# Patient Record
Sex: MALE | Race: WHITE | NOT HISPANIC OR LATINO | URBAN - METROPOLITAN AREA
[De-identification: names, ages, dates, MRNs, and addresses within clinical notes are randomized per-mention and may not be internally consistent; named-entity substitution may affect disease eponyms.]

---

## 2017-10-16 ENCOUNTER — OUTPATIENT (OUTPATIENT)
Dept: OUTPATIENT SERVICES | Facility: HOSPITAL | Age: 42
LOS: 1 days | Discharge: HOME | End: 2017-10-16

## 2017-10-16 DIAGNOSIS — Z00.01 ENCOUNTER FOR GENERAL ADULT MEDICAL EXAMINATION WITH ABNORMAL FINDINGS: ICD-10-CM

## 2018-10-18 ENCOUNTER — OUTPATIENT (OUTPATIENT)
Dept: OUTPATIENT SERVICES | Facility: HOSPITAL | Age: 43
LOS: 1 days | Discharge: HOME | End: 2018-10-18

## 2018-10-18 DIAGNOSIS — R59.1 GENERALIZED ENLARGED LYMPH NODES: ICD-10-CM

## 2018-10-18 DIAGNOSIS — E78.00 PURE HYPERCHOLESTEROLEMIA, UNSPECIFIED: ICD-10-CM

## 2018-10-18 DIAGNOSIS — J02.9 ACUTE PHARYNGITIS, UNSPECIFIED: ICD-10-CM

## 2018-10-18 DIAGNOSIS — R73.09 OTHER ABNORMAL GLUCOSE: ICD-10-CM

## 2018-10-31 ENCOUNTER — OUTPATIENT (OUTPATIENT)
Dept: OUTPATIENT SERVICES | Facility: HOSPITAL | Age: 43
LOS: 1 days | Discharge: HOME | End: 2018-10-31

## 2018-10-31 DIAGNOSIS — E04.2 NONTOXIC MULTINODULAR GOITER: ICD-10-CM

## 2019-02-08 ENCOUNTER — OUTPATIENT (OUTPATIENT)
Dept: OUTPATIENT SERVICES | Facility: HOSPITAL | Age: 44
LOS: 1 days | Discharge: HOME | End: 2019-02-08

## 2019-02-08 DIAGNOSIS — N39.0 URINARY TRACT INFECTION, SITE NOT SPECIFIED: ICD-10-CM

## 2019-02-18 PROBLEM — Z00.00 ENCOUNTER FOR PREVENTIVE HEALTH EXAMINATION: Status: ACTIVE | Noted: 2019-02-18

## 2019-03-20 ENCOUNTER — TRANSCRIPTION ENCOUNTER (OUTPATIENT)
Age: 44
End: 2019-03-20

## 2019-03-20 ENCOUNTER — APPOINTMENT (OUTPATIENT)
Dept: UROLOGY | Facility: CLINIC | Age: 44
End: 2019-03-20
Payer: COMMERCIAL

## 2019-03-20 VITALS — BODY MASS INDEX: 37.25 KG/M2 | HEIGHT: 72 IN | WEIGHT: 275 LBS

## 2019-03-20 DIAGNOSIS — Z78.9 OTHER SPECIFIED HEALTH STATUS: ICD-10-CM

## 2019-03-20 DIAGNOSIS — F17.210 NICOTINE DEPENDENCE, CIGARETTES, UNCOMPLICATED: ICD-10-CM

## 2019-03-20 PROCEDURE — 99203 OFFICE O/P NEW LOW 30 MIN: CPT

## 2019-03-20 NOTE — REVIEW OF SYSTEMS
[Chest Pain] : no chest pain [Fever] : no fever [Shortness Of Breath] : no shortness of breath [Constipation] : no constipation [Diarrhea] : no diarrhea [Dysuria] : no dysuria [Confused] : no confusion

## 2019-03-20 NOTE — ASSESSMENT
[FreeTextEntry1] : BPH with lower urinary tract symptoms. Patient states is not bothered enough symptoms to warrant treatment. Return to office yearly sooner if necessary

## 2019-03-20 NOTE — HISTORY OF PRESENT ILLNESS
[FreeTextEntry1] : 44-year-old with history of lower urinary tract symptoms. He has nocturia x0-1, weak urinary flow, rare straining, no urgency, rare intermittency, some daytime frequency, no sensation of incomplete voiding. He does have postvoid dribbling. PSA is one. Uncle had prostate cancer

## 2019-03-20 NOTE — PHYSICAL EXAM
[Normal Appearance] : normal appearance [General Appearance - In No Acute Distress] : no acute distress [] : no respiratory distress [Prostate Tenderness] : the prostate was not tender [Prostate Size ___ (0-4)] : prostate size [unfilled] (scale: 0-4) [No Prostate Nodules] : no prostate nodules [Normal Station and Gait] : the gait and station were normal for the patient's age [Oriented To Time, Place, And Person] : oriented to person, place, and time

## 2019-07-10 ENCOUNTER — TRANSCRIPTION ENCOUNTER (OUTPATIENT)
Age: 44
End: 2019-07-10

## 2020-06-12 ENCOUNTER — APPOINTMENT (OUTPATIENT)
Dept: UROLOGY | Facility: CLINIC | Age: 45
End: 2020-06-12
Payer: COMMERCIAL

## 2020-06-12 VITALS — HEIGHT: 72 IN | TEMPERATURE: 98 F | WEIGHT: 275 LBS | BODY MASS INDEX: 37.25 KG/M2

## 2020-06-12 PROCEDURE — 99213 OFFICE O/P EST LOW 20 MIN: CPT

## 2020-06-12 NOTE — HISTORY OF PRESENT ILLNESS
[FreeTextEntry1] : 45-year-old with BPH and lower urinary tract symptoms. He states no change in symptoms from one year ago with nocturia x0-1, variable urinary stream, or straining, no urgency, rare intermittency and some daytime frequency, no sensation of incomplete voiding. PSA was one one year ago. He has a family history of prostate cancer in his uncle

## 2020-06-12 NOTE — PHYSICAL EXAM
[Normal Appearance] : normal appearance [General Appearance - In No Acute Distress] : no acute distress [Prostate Tenderness] : the prostate was not tender [No Prostate Nodules] : no prostate nodules [Prostate Size ___ (0-4)] : prostate size [unfilled] (scale: 0-4) [Edema] : no peripheral edema [] : no respiratory distress [Respiration, Rhythm And Depth] : normal respiratory rhythm and effort [Oriented To Time, Place, And Person] : oriented to person, place, and time [Normal Station and Gait] : the gait and station were normal for the patient's age

## 2020-06-12 NOTE — REVIEW OF SYSTEMS
[Fever] : no fever [Feeling Poorly] : not feeling poorly [Discharge From Eyes] : no purulent discharge from the eyes [Dry Eyes] : no dryness of the eyes [Nasal Discharge] : no nasal discharge [Sore Throat] : no sore throat [Chest Pain] : no chest pain [Lower Ext Edema] : no extremity edema [Shortness Of Breath] : no shortness of breath [Cough] : no cough [Abdominal Pain] : no abdominal pain [Constipation] : no constipation [Diarrhea] : no diarrhea [Dysuria] : no dysuria [Arthralgias] : no arthralgias [Joint Swelling] : no joint swelling [Confused] : no confusion

## 2020-06-12 NOTE — ASSESSMENT
[FreeTextEntry1] : Stable BPH and lower urinary tract symptoms requiring no treatment [Urinary Symptom or Sign (788.99\R39.89)] : implantation

## 2021-06-15 ENCOUNTER — APPOINTMENT (OUTPATIENT)
Dept: UROLOGY | Facility: CLINIC | Age: 46
End: 2021-06-15

## 2021-12-02 ENCOUNTER — APPOINTMENT (OUTPATIENT)
Dept: SURGERY | Facility: CLINIC | Age: 46
End: 2021-12-02
Payer: COMMERCIAL

## 2021-12-02 VITALS
HEART RATE: 95 BPM | DIASTOLIC BLOOD PRESSURE: 84 MMHG | HEIGHT: 70.5 IN | SYSTOLIC BLOOD PRESSURE: 130 MMHG | WEIGHT: 278 LBS | BODY MASS INDEX: 39.36 KG/M2 | OXYGEN SATURATION: 98 % | TEMPERATURE: 97.7 F

## 2021-12-02 PROCEDURE — 99204 OFFICE O/P NEW MOD 45 MIN: CPT

## 2021-12-02 NOTE — ASSESSMENT
[FreeTextEntry1] : 45 y/o male with Class 2 obesity, HTN, and hyperlipidemia interested in bariatric surgery.

## 2021-12-02 NOTE — PLAN
[FreeTextEntry1] : Mr. DARREN VANCE is a 46 year year old male with obesity who is interested in undergoing laparoscopic sleeve gastrectomy for weight loss.  We discussed in detail how Bariatric Surgery is a tool to help treat the serious disease of obesity, and that His compliance with diet, exercise, and follow-up is crucial to His overall safety and success.  He will require the following pre-operative evaluations and testing:\par \par Lab work\par Psychological evaluation\par Diet history\par Cardiology evaluation\par Pulmonology evaluation, including sleep study\par Gastroenterology evaluation for upper endoscopy\par PMD clearance\par Nutritional evaluation\par Attendance at preoperative support groups\par \par We had a discussion about potential post-operative complications, including but not limited to: bleeding, infection, leak, stricture, blood clots, GERD, problems with anesthesia.  The patient understands and wishes to proceed.\par \par We discussed that smoking of any kind will preclude the patient from being able to have bariatric surgery.\par \par

## 2021-12-02 NOTE — HISTORY OF PRESENT ILLNESS
[de-identified] : Mr. DARREN VANCE is a pleasant 46 year year old male who has been struggling with His weight for many years.    Mr. DARREN VANCE has tried countless diet and exercise programs, but has been unable to lose sufficient weight and keep it off.  He is here today to discuss surgical options for weight loss.\par

## 2021-12-15 ENCOUNTER — NON-APPOINTMENT (OUTPATIENT)
Age: 46
End: 2021-12-15

## 2021-12-16 ENCOUNTER — LABORATORY RESULT (OUTPATIENT)
Age: 46
End: 2021-12-16

## 2021-12-16 LAB
ALBUMIN SERPL ELPH-MCNC: 4.8 G/DL
ALP BLD-CCNC: 50 U/L
ALT SERPL-CCNC: 38 U/L
ANION GAP SERPL CALC-SCNC: 12 MMOL/L
APTT BLD: 35.1 SEC
AST SERPL-CCNC: 19 U/L
BASOPHILS # BLD AUTO: 0.06 K/UL
BASOPHILS NFR BLD AUTO: 0.8 %
BILIRUB SERPL-MCNC: 0.4 MG/DL
BUN SERPL-MCNC: 16 MG/DL
CALCIUM SERPL-MCNC: 9.8 MG/DL
CHLORIDE SERPL-SCNC: 108 MMOL/L
CHOLEST SERPL-MCNC: 127 MG/DL
CO2 SERPL-SCNC: 22 MMOL/L
CREAT SERPL-MCNC: 1.2 MG/DL
EOSINOPHIL # BLD AUTO: 0.17 K/UL
EOSINOPHIL NFR BLD AUTO: 2.4 %
ESTIMATED AVERAGE GLUCOSE: 117 MG/DL
GGT SERPL-CCNC: 24 U/L
GLUCOSE SERPL-MCNC: 100 MG/DL
HBA1C MFR BLD HPLC: 5.7 %
HCT VFR BLD CALC: 43.4 %
HDLC SERPL-MCNC: 39 MG/DL
HGB BLD-MCNC: 14.4 G/DL
IMM GRANULOCYTES NFR BLD AUTO: 0.1 %
INR PPP: 1.04 RATIO
IRON SERPL-MCNC: 70 UG/DL
LDLC SERPL CALC-MCNC: 74 MG/DL
LYMPHOCYTES # BLD AUTO: 1.75 K/UL
LYMPHOCYTES NFR BLD AUTO: 24.4 %
MAGNESIUM SERPL-MCNC: 2 MG/DL
MAN DIFF?: NORMAL
MCHC RBC-ENTMCNC: 29.1 PG
MCHC RBC-ENTMCNC: 33.2 G/DL
MCV RBC AUTO: 87.7 FL
MONOCYTES # BLD AUTO: 0.4 K/UL
MONOCYTES NFR BLD AUTO: 5.6 %
NEUTROPHILS # BLD AUTO: 4.79 K/UL
NEUTROPHILS NFR BLD AUTO: 66.7 %
NONHDLC SERPL-MCNC: 88 MG/DL
PLATELET # BLD AUTO: 229 K/UL
POTASSIUM SERPL-SCNC: 4.4 MMOL/L
PROT SERPL-MCNC: 7.1 G/DL
PT BLD: 11.9 SEC
RBC # BLD: 4.95 M/UL
RBC # FLD: 13.4 %
SODIUM SERPL-SCNC: 142 MMOL/L
T4 FREE SERPL-MCNC: 1.4 NG/DL
TRIGL SERPL-MCNC: 110 MG/DL
TSH SERPL-ACNC: 0.58 UIU/ML
WBC # FLD AUTO: 7.18 K/UL

## 2021-12-17 LAB
25(OH)D3 SERPL-MCNC: 42 NG/ML
PREALB SERPL NEPH-MCNC: 35 MG/DL
T3FREE SERPL-MCNC: 3.86 PG/ML
VIT B12 SERPL-MCNC: 302 PG/ML

## 2021-12-22 ENCOUNTER — NON-APPOINTMENT (OUTPATIENT)
Age: 46
End: 2021-12-22

## 2021-12-23 ENCOUNTER — NON-APPOINTMENT (OUTPATIENT)
Age: 46
End: 2021-12-23

## 2021-12-23 LAB
VIT A SERPL-MCNC: 74.2 UG/DL
VIT C SERPL-MCNC: 0.6 MG/DL
ZINC SERPL-MCNC: 108 UG/DL

## 2021-12-28 LAB — VIT B1 SERPL-MCNC: 169.1 NMOL/L

## 2022-01-03 ENCOUNTER — APPOINTMENT (OUTPATIENT)
Dept: SURGERY | Facility: CLINIC | Age: 47
End: 2022-01-03
Payer: COMMERCIAL

## 2022-01-03 VITALS — HEIGHT: 70.5 IN

## 2022-01-03 PROCEDURE — ZZZZZ: CPT

## 2022-01-31 ENCOUNTER — APPOINTMENT (OUTPATIENT)
Age: 47
End: 2022-01-31
Payer: COMMERCIAL

## 2022-01-31 VITALS
BODY MASS INDEX: 39.2 KG/M2 | RESPIRATION RATE: 14 BRPM | OXYGEN SATURATION: 99 % | HEIGHT: 71 IN | SYSTOLIC BLOOD PRESSURE: 128 MMHG | DIASTOLIC BLOOD PRESSURE: 78 MMHG | HEART RATE: 80 BPM | WEIGHT: 280 LBS

## 2022-01-31 DIAGNOSIS — Z86.79 PERSONAL HISTORY OF OTHER DISEASES OF THE CIRCULATORY SYSTEM: ICD-10-CM

## 2022-01-31 PROCEDURE — 99203 OFFICE O/P NEW LOW 30 MIN: CPT

## 2022-01-31 NOTE — PHYSICAL EXAM
[No Acute Distress] : no acute distress [IV] : Mallampati Class: IV [Normal Appearance] : normal appearance [No Neck Mass] : no neck mass [Normal Rate/Rhythm] : normal rate/rhythm [Normal S1, S2] : normal s1, s2 [No Murmurs] : no murmurs [No Resp Distress] : no resp distress [Clear to Auscultation Bilaterally] : clear to auscultation bilaterally [No Abnormalities] : no abnormalities [Benign] : benign [Normal Gait] : normal gait [No Clubbing] : no clubbing [No Cyanosis] : no cyanosis [No Edema] : no edema [FROM] : FROM [Normal Color/ Pigmentation] : normal color/ pigmentation [No Focal Deficits] : no focal deficits [Oriented x3] : oriented x3 [Normal Affect] : normal affect

## 2022-01-31 NOTE — HISTORY OF PRESENT ILLNESS
[Initial Evaluation] : an initial evaluation of [Witnessed Apnea] : witnessed apnea during sleep [Witnessed Gasping] : witnessed gasping during sleep [Snoring] : snoring [Unrefreshing Sleep] : unrefreshing sleep [Currently Experiencing] : The patient is currently experiencing symptoms. [Gradual] : gradual [Moderate] : moderate in severity [Sleeping on Back] : sleeping on back [Fatigue] : fatigue [Weight Gain] : weight gain [None] : The patient is not currently being treated for this problem [TextBox_4] : SENT FOR PRE OP CLEARANCE FOR BARIATRIC SX\par REPORTS TATIANNA SYMPTOMS\par NON SMOKER

## 2022-02-14 ENCOUNTER — APPOINTMENT (OUTPATIENT)
Dept: SURGERY | Facility: CLINIC | Age: 47
End: 2022-02-14
Payer: COMMERCIAL

## 2022-02-14 VITALS — HEIGHT: 71 IN | BODY MASS INDEX: 39.2 KG/M2 | WEIGHT: 280 LBS

## 2022-02-14 PROCEDURE — ZZZZZ: CPT

## 2022-02-22 ENCOUNTER — LABORATORY RESULT (OUTPATIENT)
Age: 47
End: 2022-02-22

## 2022-02-24 ENCOUNTER — OUTPATIENT (OUTPATIENT)
Dept: OUTPATIENT SERVICES | Facility: HOSPITAL | Age: 47
LOS: 1 days | Discharge: HOME | End: 2022-02-24
Payer: COMMERCIAL

## 2022-02-24 PROCEDURE — 95810 POLYSOM 6/> YRS 4/> PARAM: CPT | Mod: 26

## 2022-02-25 ENCOUNTER — OUTPATIENT (OUTPATIENT)
Dept: OUTPATIENT SERVICES | Facility: HOSPITAL | Age: 47
LOS: 1 days | Discharge: HOME | End: 2022-02-25
Payer: COMMERCIAL

## 2022-02-25 DIAGNOSIS — R06.02 SHORTNESS OF BREATH: ICD-10-CM

## 2022-02-25 DIAGNOSIS — G47.33 OBSTRUCTIVE SLEEP APNEA (ADULT) (PEDIATRIC): ICD-10-CM

## 2022-02-25 PROCEDURE — 94060 EVALUATION OF WHEEZING: CPT | Mod: 26

## 2022-02-25 PROCEDURE — 94727 GAS DIL/WSHOT DETER LNG VOL: CPT | Mod: 26

## 2022-02-25 PROCEDURE — 94729 DIFFUSING CAPACITY: CPT | Mod: 26

## 2022-03-03 ENCOUNTER — APPOINTMENT (OUTPATIENT)
Dept: SURGERY | Facility: CLINIC | Age: 47
End: 2022-03-03
Payer: COMMERCIAL

## 2022-03-03 VITALS — BODY MASS INDEX: 39.34 KG/M2 | HEIGHT: 71 IN | WEIGHT: 281 LBS

## 2022-03-03 PROCEDURE — ZZZZZ: CPT

## 2022-03-10 ENCOUNTER — NON-APPOINTMENT (OUTPATIENT)
Age: 47
End: 2022-03-10

## 2022-03-12 ENCOUNTER — TRANSCRIPTION ENCOUNTER (OUTPATIENT)
Age: 47
End: 2022-03-12

## 2022-03-17 ENCOUNTER — APPOINTMENT (OUTPATIENT)
Dept: GASTROENTEROLOGY | Facility: CLINIC | Age: 47
End: 2022-03-17

## 2022-05-25 ENCOUNTER — APPOINTMENT (OUTPATIENT)
Age: 47
End: 2022-05-25
Payer: COMMERCIAL

## 2022-05-25 VITALS
OXYGEN SATURATION: 98 % | WEIGHT: 283 LBS | BODY MASS INDEX: 39.62 KG/M2 | SYSTOLIC BLOOD PRESSURE: 126 MMHG | HEIGHT: 71 IN | DIASTOLIC BLOOD PRESSURE: 82 MMHG | RESPIRATION RATE: 14 BRPM | HEART RATE: 119 BPM

## 2022-05-25 PROCEDURE — 99213 OFFICE O/P EST LOW 20 MIN: CPT

## 2022-05-25 NOTE — DISCUSSION/SUMMARY
[FreeTextEntry1] : EARLY MOD TATIANNA DENIES SYMPTOMS\par PFT NL\par \par PULMONARY STANDPOINT NO CONTRAINDICATION TO PLANNED PROCEDURE, INTERMEDIATE RISK, CXR PREOP

## 2022-06-01 RX ORDER — AZELASTINE HYDROCHLORIDE 205.5 UG/1
0.15 SPRAY, METERED NASAL
Qty: 1 | Refills: 5 | Status: ACTIVE | COMMUNITY
Start: 2022-06-01 | End: 1900-01-01

## 2022-06-02 ENCOUNTER — RX RENEWAL (OUTPATIENT)
Age: 47
End: 2022-06-02

## 2022-06-02 RX ORDER — FLUTICASONE PROPIONATE 50 UG/1
50 SPRAY, METERED NASAL
Qty: 48 | Refills: 0 | Status: ACTIVE | COMMUNITY
Start: 2022-06-01 | End: 1900-01-01

## 2022-06-08 ENCOUNTER — NON-APPOINTMENT (OUTPATIENT)
Age: 47
End: 2022-06-08

## 2022-06-23 ENCOUNTER — OUTPATIENT (OUTPATIENT)
Dept: OUTPATIENT SERVICES | Facility: HOSPITAL | Age: 47
LOS: 1 days | Discharge: HOME | End: 2022-06-23
Payer: COMMERCIAL

## 2022-06-23 VITALS
HEART RATE: 86 BPM | SYSTOLIC BLOOD PRESSURE: 134 MMHG | OXYGEN SATURATION: 95 % | RESPIRATION RATE: 16 BRPM | WEIGHT: 285.06 LBS | HEIGHT: 71 IN | TEMPERATURE: 98 F | DIASTOLIC BLOOD PRESSURE: 92 MMHG

## 2022-06-23 DIAGNOSIS — Z90.49 ACQUIRED ABSENCE OF OTHER SPECIFIED PARTS OF DIGESTIVE TRACT: Chronic | ICD-10-CM

## 2022-06-23 DIAGNOSIS — E66.01 MORBID (SEVERE) OBESITY DUE TO EXCESS CALORIES: ICD-10-CM

## 2022-06-23 DIAGNOSIS — Z01.818 ENCOUNTER FOR OTHER PREPROCEDURAL EXAMINATION: ICD-10-CM

## 2022-06-23 LAB
ALBUMIN SERPL ELPH-MCNC: 5.3 G/DL — HIGH (ref 3.5–5.2)
ALP SERPL-CCNC: 50 U/L — SIGNIFICANT CHANGE UP (ref 30–115)
ALT FLD-CCNC: 37 U/L — SIGNIFICANT CHANGE UP (ref 0–41)
ANION GAP SERPL CALC-SCNC: 13 MMOL/L — SIGNIFICANT CHANGE UP (ref 7–14)
APTT BLD: 32.6 SEC — SIGNIFICANT CHANGE UP (ref 27–39.2)
AST SERPL-CCNC: 21 U/L — SIGNIFICANT CHANGE UP (ref 0–41)
BASOPHILS # BLD AUTO: 0.07 K/UL — SIGNIFICANT CHANGE UP (ref 0–0.2)
BASOPHILS NFR BLD AUTO: 1 % — SIGNIFICANT CHANGE UP (ref 0–1)
BILIRUB SERPL-MCNC: 0.4 MG/DL — SIGNIFICANT CHANGE UP (ref 0.2–1.2)
BLD GP AB SCN SERPL QL: SIGNIFICANT CHANGE UP
BUN SERPL-MCNC: 16 MG/DL — SIGNIFICANT CHANGE UP (ref 10–20)
CALCIUM SERPL-MCNC: 10 MG/DL — SIGNIFICANT CHANGE UP (ref 8.5–10.1)
CHLORIDE SERPL-SCNC: 102 MMOL/L — SIGNIFICANT CHANGE UP (ref 98–110)
CO2 SERPL-SCNC: 23 MMOL/L — SIGNIFICANT CHANGE UP (ref 17–32)
CREAT SERPL-MCNC: 1.1 MG/DL — SIGNIFICANT CHANGE UP (ref 0.7–1.5)
EGFR: 83 ML/MIN/1.73M2 — SIGNIFICANT CHANGE UP
EOSINOPHIL # BLD AUTO: 0.18 K/UL — SIGNIFICANT CHANGE UP (ref 0–0.7)
EOSINOPHIL NFR BLD AUTO: 2.6 % — SIGNIFICANT CHANGE UP (ref 0–8)
GLUCOSE SERPL-MCNC: 86 MG/DL — SIGNIFICANT CHANGE UP (ref 70–99)
HCT VFR BLD CALC: 46.3 % — SIGNIFICANT CHANGE UP (ref 42–52)
HGB BLD-MCNC: 15.9 G/DL — SIGNIFICANT CHANGE UP (ref 14–18)
IMM GRANULOCYTES NFR BLD AUTO: 0.3 % — SIGNIFICANT CHANGE UP (ref 0.1–0.3)
INR BLD: 1.01 RATIO — SIGNIFICANT CHANGE UP (ref 0.65–1.3)
LYMPHOCYTES # BLD AUTO: 2.42 K/UL — SIGNIFICANT CHANGE UP (ref 1.2–3.4)
LYMPHOCYTES # BLD AUTO: 35.5 % — SIGNIFICANT CHANGE UP (ref 20.5–51.1)
MCHC RBC-ENTMCNC: 29 PG — SIGNIFICANT CHANGE UP (ref 27–31)
MCHC RBC-ENTMCNC: 34.3 G/DL — SIGNIFICANT CHANGE UP (ref 32–37)
MCV RBC AUTO: 84.5 FL — SIGNIFICANT CHANGE UP (ref 80–94)
MONOCYTES # BLD AUTO: 0.45 K/UL — SIGNIFICANT CHANGE UP (ref 0.1–0.6)
MONOCYTES NFR BLD AUTO: 6.6 % — SIGNIFICANT CHANGE UP (ref 1.7–9.3)
NEUTROPHILS # BLD AUTO: 3.68 K/UL — SIGNIFICANT CHANGE UP (ref 1.4–6.5)
NEUTROPHILS NFR BLD AUTO: 54 % — SIGNIFICANT CHANGE UP (ref 42.2–75.2)
NRBC # BLD: 0 /100 WBCS — SIGNIFICANT CHANGE UP (ref 0–0)
PLATELET # BLD AUTO: 227 K/UL — SIGNIFICANT CHANGE UP (ref 130–400)
POTASSIUM SERPL-MCNC: 3.9 MMOL/L — SIGNIFICANT CHANGE UP (ref 3.5–5)
POTASSIUM SERPL-SCNC: 3.9 MMOL/L — SIGNIFICANT CHANGE UP (ref 3.5–5)
PROT SERPL-MCNC: 7.7 G/DL — SIGNIFICANT CHANGE UP (ref 6–8)
PROTHROM AB SERPL-ACNC: 11.6 SEC — SIGNIFICANT CHANGE UP (ref 9.95–12.87)
RBC # BLD: 5.48 M/UL — SIGNIFICANT CHANGE UP (ref 4.7–6.1)
RBC # FLD: 12.6 % — SIGNIFICANT CHANGE UP (ref 11.5–14.5)
SODIUM SERPL-SCNC: 138 MMOL/L — SIGNIFICANT CHANGE UP (ref 135–146)
WBC # BLD: 6.82 K/UL — SIGNIFICANT CHANGE UP (ref 4.8–10.8)
WBC # FLD AUTO: 6.82 K/UL — SIGNIFICANT CHANGE UP (ref 4.8–10.8)

## 2022-06-23 PROCEDURE — 93010 ELECTROCARDIOGRAM REPORT: CPT

## 2022-06-23 NOTE — H&P PST ADULT - HISTORY OF PRESENT ILLNESS
47YR old male with morbid obesity -PMH HTN, HDL opts to have bariatric surgery -Laparoscopic sleeve gastrectomy, possible hiatal hernia repair, possible open, possible intraoperative endoscopy and all indicated procedures. Denies COVID S/S. Recd 2 doses vaccine. Verbalized understanding of COVID prevention measures. Exercise juana 2FOS.  Anesthesia Alert  YES--Difficult Airway  NO--History of neck surgery or radiation  NO--Limited ROM of neck  NO--History of Malignant hyperthermia  NO--Personal or family history of Pseudocholinesterase deficiency  NO--Prior Anesthesia Complication  NO--Latex Allergy  NO--Loose teeth  NO--History of Rheumatoid Arthritis  NO--TATIANNA  No Bleeding risk  NO--Other_____

## 2022-06-23 NOTE — H&P PST ADULT - NSICDXPASTMEDICALHX_GEN_ALL_CORE_FT
PAST MEDICAL HISTORY:  GERD (gastroesophageal reflux disease)     HTN (hypertension)     Hypercholesteremia     Obesity

## 2022-06-23 NOTE — H&P PST ADULT - NSANTHOSAYNRD_GEN_A_CORE
No. TATIANNA screening performed.  STOP BANG Legend: 0-2 = LOW Risk; 3-4 = INTERMEDIATE Risk; 5-8 = HIGH Risk

## 2022-06-24 LAB
A1C WITH ESTIMATED AVERAGE GLUCOSE RESULT: 5.8 % — HIGH (ref 4–5.6)
ESTIMATED AVERAGE GLUCOSE: 120 MG/DL — HIGH (ref 68–114)

## 2022-06-27 RX ORDER — ATORVASTATIN CALCIUM 20 MG/1
20 TABLET, FILM COATED ORAL
Refills: 0 | Status: ACTIVE | COMMUNITY

## 2022-07-01 ENCOUNTER — APPOINTMENT (OUTPATIENT)
Dept: SURGERY | Facility: CLINIC | Age: 47
End: 2022-07-01

## 2022-07-01 VITALS
OXYGEN SATURATION: 97 % | SYSTOLIC BLOOD PRESSURE: 120 MMHG | TEMPERATURE: 93.9 F | HEIGHT: 71 IN | DIASTOLIC BLOOD PRESSURE: 90 MMHG | WEIGHT: 289.13 LBS | HEART RATE: 104 BPM | BODY MASS INDEX: 40.48 KG/M2

## 2022-07-01 PROCEDURE — 99213 OFFICE O/P EST LOW 20 MIN: CPT

## 2022-07-01 NOTE — REASON FOR VISIT
[Follow-Up Visit] : a follow-up visit for [Morbid Obesity (BMI<40)] : morbid obesity (bmi<40) [FreeTextEntry2] : Patient presents for Preoperative Bariatric visit

## 2022-07-01 NOTE — ASSESSMENT
[FreeTextEntry1] : DARREN VANCE is a 47 year male seen today for Preoperative Bariatric follow up visit. All medications were reviewed with patient and instructions were given in respect to medications to take on the day of surgery as well as postoperatively.  Written instructions and materials were provided.  All questions were answered.\par

## 2022-07-01 NOTE — HISTORY OF PRESENT ILLNESS
[de-identified] : DARREN VANCE  underwent extensive preoperative workup and is scheduled to have Laparoscopic Sleeve Gastrectomy on 7/11/2022. Patient will benefit from surgery to improve his quality of life and for management of his comorbid conditions.\par At this preoperative visit, we discussed the risks, benefits and alternatives to weight loss surgery. We specifically discussed the risks of anesthesia including cardiac and pulmonary complications, DVT/PE, pneumonia, leaks, infection, death, bleeding, ulcers, and need for additional operations have been reviewed. We discussed the importance of a consistent diet and exercise regimen in regards to weight loss and maintenance as well. The importance of lifelong mineral and vitamin supplementation was also reviewed with the patient. The patient was given ample opportunity to ask questions and all questions were answered\par

## 2022-07-01 NOTE — PLAN
[FreeTextEntry1] : Plan: Proceed with surgery on 7/11/2022.\par          RTO for postop visit on 7/20/22.\par          Call with concerns.

## 2022-07-10 RX ORDER — ACETAMINOPHEN 500 MG
1000 TABLET ORAL ONCE
Refills: 0 | Status: DISCONTINUED | OUTPATIENT
Start: 2022-07-11 | End: 2022-07-12

## 2022-07-11 ENCOUNTER — RESULT REVIEW (OUTPATIENT)
Age: 47
End: 2022-07-11

## 2022-07-11 ENCOUNTER — APPOINTMENT (OUTPATIENT)
Dept: SURGERY | Facility: HOSPITAL | Age: 47
End: 2022-07-11

## 2022-07-11 ENCOUNTER — TRANSCRIPTION ENCOUNTER (OUTPATIENT)
Age: 47
End: 2022-07-11

## 2022-07-11 ENCOUNTER — INPATIENT (INPATIENT)
Facility: HOSPITAL | Age: 47
LOS: 0 days | Discharge: HOME | End: 2022-07-12
Attending: SURGERY | Admitting: SURGERY

## 2022-07-11 VITALS
HEART RATE: 95 BPM | WEIGHT: 279.99 LBS | SYSTOLIC BLOOD PRESSURE: 129 MMHG | TEMPERATURE: 98 F | RESPIRATION RATE: 17 BRPM | DIASTOLIC BLOOD PRESSURE: 81 MMHG | HEIGHT: 72 IN

## 2022-07-11 DIAGNOSIS — Z90.49 ACQUIRED ABSENCE OF OTHER SPECIFIED PARTS OF DIGESTIVE TRACT: Chronic | ICD-10-CM

## 2022-07-11 LAB
ABO RH CONFIRMATION: SIGNIFICANT CHANGE UP
ANION GAP SERPL CALC-SCNC: 14 MMOL/L — SIGNIFICANT CHANGE UP (ref 7–14)
BUN SERPL-MCNC: 14 MG/DL — SIGNIFICANT CHANGE UP (ref 10–20)
CALCIUM SERPL-MCNC: 8.7 MG/DL — SIGNIFICANT CHANGE UP (ref 8.5–10.1)
CHLORIDE SERPL-SCNC: 104 MMOL/L — SIGNIFICANT CHANGE UP (ref 98–110)
CO2 SERPL-SCNC: 22 MMOL/L — SIGNIFICANT CHANGE UP (ref 17–32)
CREAT SERPL-MCNC: 1.1 MG/DL — SIGNIFICANT CHANGE UP (ref 0.7–1.5)
EGFR: 83 ML/MIN/1.73M2 — SIGNIFICANT CHANGE UP
GLUCOSE SERPL-MCNC: 111 MG/DL — HIGH (ref 70–99)
HCT VFR BLD CALC: 39.5 % — LOW (ref 42–52)
HGB BLD-MCNC: 13.8 G/DL — LOW (ref 14–18)
MAGNESIUM SERPL-MCNC: 1.8 MG/DL — SIGNIFICANT CHANGE UP (ref 1.8–2.4)
MCHC RBC-ENTMCNC: 29.4 PG — SIGNIFICANT CHANGE UP (ref 27–31)
MCHC RBC-ENTMCNC: 34.9 G/DL — SIGNIFICANT CHANGE UP (ref 32–37)
MCV RBC AUTO: 84.2 FL — SIGNIFICANT CHANGE UP (ref 80–94)
NRBC # BLD: 0 /100 WBCS — SIGNIFICANT CHANGE UP (ref 0–0)
PHOSPHATE SERPL-MCNC: 3.5 MG/DL — SIGNIFICANT CHANGE UP (ref 2.1–4.9)
PLATELET # BLD AUTO: 197 K/UL — SIGNIFICANT CHANGE UP (ref 130–400)
POTASSIUM SERPL-MCNC: 4 MMOL/L — SIGNIFICANT CHANGE UP (ref 3.5–5)
POTASSIUM SERPL-SCNC: 4 MMOL/L — SIGNIFICANT CHANGE UP (ref 3.5–5)
RBC # BLD: 4.69 M/UL — LOW (ref 4.7–6.1)
RBC # FLD: 13.2 % — SIGNIFICANT CHANGE UP (ref 11.5–14.5)
SODIUM SERPL-SCNC: 140 MMOL/L — SIGNIFICANT CHANGE UP (ref 135–146)
WBC # BLD: 8.82 K/UL — SIGNIFICANT CHANGE UP (ref 4.8–10.8)
WBC # FLD AUTO: 8.82 K/UL — SIGNIFICANT CHANGE UP (ref 4.8–10.8)

## 2022-07-11 PROCEDURE — 99223 1ST HOSP IP/OBS HIGH 75: CPT | Mod: 24,25

## 2022-07-11 PROCEDURE — 88307 TISSUE EXAM BY PATHOLOGIST: CPT | Mod: 26

## 2022-07-11 PROCEDURE — 43775 LAP SLEEVE GASTRECTOMY: CPT

## 2022-07-11 PROCEDURE — 93010 ELECTROCARDIOGRAM REPORT: CPT

## 2022-07-11 RX ORDER — SODIUM CHLORIDE 9 MG/ML
1000 INJECTION, SOLUTION INTRAVENOUS
Refills: 0 | Status: DISCONTINUED | OUTPATIENT
Start: 2022-07-11 | End: 2022-07-11

## 2022-07-11 RX ORDER — LOSARTAN POTASSIUM 100 MG/1
1 TABLET, FILM COATED ORAL
Qty: 0 | Refills: 0 | DISCHARGE

## 2022-07-11 RX ORDER — HYDROMORPHONE HYDROCHLORIDE 2 MG/ML
0.5 INJECTION INTRAMUSCULAR; INTRAVENOUS; SUBCUTANEOUS
Refills: 0 | Status: DISCONTINUED | OUTPATIENT
Start: 2022-07-11 | End: 2022-07-11

## 2022-07-11 RX ORDER — ATORVASTATIN CALCIUM 80 MG/1
20 TABLET, FILM COATED ORAL AT BEDTIME
Refills: 0 | Status: DISCONTINUED | OUTPATIENT
Start: 2022-07-11 | End: 2022-07-12

## 2022-07-11 RX ORDER — PANTOPRAZOLE SODIUM 20 MG/1
40 TABLET, DELAYED RELEASE ORAL DAILY
Refills: 0 | Status: DISCONTINUED | OUTPATIENT
Start: 2022-07-11 | End: 2022-07-12

## 2022-07-11 RX ORDER — FAMOTIDINE 10 MG/ML
20 INJECTION INTRAVENOUS ONCE
Refills: 0 | Status: COMPLETED | OUTPATIENT
Start: 2022-07-11 | End: 2022-07-11

## 2022-07-11 RX ORDER — HYDROMORPHONE HYDROCHLORIDE 2 MG/ML
1 INJECTION INTRAMUSCULAR; INTRAVENOUS; SUBCUTANEOUS
Refills: 0 | Status: DISCONTINUED | OUTPATIENT
Start: 2022-07-11 | End: 2022-07-11

## 2022-07-11 RX ORDER — ICOSAPENT ETHYL 500 MG/1
2 CAPSULE, LIQUID FILLED ORAL
Qty: 0 | Refills: 0 | DISCHARGE

## 2022-07-11 RX ORDER — ONDANSETRON 8 MG/1
4 TABLET, FILM COATED ORAL EVERY 6 HOURS
Refills: 0 | Status: DISCONTINUED | OUTPATIENT
Start: 2022-07-11 | End: 2022-07-12

## 2022-07-11 RX ORDER — ATORVASTATIN CALCIUM 80 MG/1
1 TABLET, FILM COATED ORAL
Qty: 0 | Refills: 0 | DISCHARGE

## 2022-07-11 RX ORDER — SCOPALAMINE 1 MG/3D
1 PATCH, EXTENDED RELEASE TRANSDERMAL ONCE
Refills: 0 | Status: COMPLETED | OUTPATIENT
Start: 2022-07-11 | End: 2022-07-11

## 2022-07-11 RX ORDER — METOCLOPRAMIDE HCL 10 MG
10 TABLET ORAL ONCE
Refills: 0 | Status: DISCONTINUED | OUTPATIENT
Start: 2022-07-11 | End: 2022-07-11

## 2022-07-11 RX ORDER — LOSARTAN POTASSIUM 100 MG/1
25 TABLET, FILM COATED ORAL DAILY
Refills: 0 | Status: DISCONTINUED | OUTPATIENT
Start: 2022-07-11 | End: 2022-07-12

## 2022-07-11 RX ORDER — OMEPRAZOLE 10 MG/1
1 CAPSULE, DELAYED RELEASE ORAL
Qty: 0 | Refills: 0 | DISCHARGE

## 2022-07-11 RX ORDER — BUPIVACAINE 13.3 MG/ML
20 INJECTION, SUSPENSION, LIPOSOMAL INFILTRATION ONCE
Refills: 0 | Status: DISCONTINUED | OUTPATIENT
Start: 2022-07-11 | End: 2022-07-11

## 2022-07-11 RX ORDER — KETOROLAC TROMETHAMINE 30 MG/ML
15 SYRINGE (ML) INJECTION EVERY 8 HOURS
Refills: 0 | Status: DISCONTINUED | OUTPATIENT
Start: 2022-07-11 | End: 2022-07-12

## 2022-07-11 RX ORDER — ENOXAPARIN SODIUM 100 MG/ML
40 INJECTION SUBCUTANEOUS ONCE
Refills: 0 | Status: COMPLETED | OUTPATIENT
Start: 2022-07-11 | End: 2022-07-11

## 2022-07-11 RX ORDER — ACETAMINOPHEN 500 MG
1000 TABLET ORAL ONCE
Refills: 0 | Status: COMPLETED | OUTPATIENT
Start: 2022-07-11 | End: 2022-07-11

## 2022-07-11 RX ORDER — FENOFIBRATE,MICRONIZED 130 MG
1 CAPSULE ORAL
Qty: 0 | Refills: 0 | DISCHARGE

## 2022-07-11 RX ORDER — GABAPENTIN 400 MG/1
100 CAPSULE ORAL THREE TIMES A DAY
Refills: 0 | Status: DISCONTINUED | OUTPATIENT
Start: 2022-07-11 | End: 2022-07-12

## 2022-07-11 RX ORDER — SODIUM CHLORIDE 9 MG/ML
1000 INJECTION, SOLUTION INTRAVENOUS
Refills: 0 | Status: DISCONTINUED | OUTPATIENT
Start: 2022-07-11 | End: 2022-07-12

## 2022-07-11 RX ORDER — ENOXAPARIN SODIUM 100 MG/ML
40 INJECTION SUBCUTANEOUS EVERY 24 HOURS
Refills: 0 | Status: DISCONTINUED | OUTPATIENT
Start: 2022-07-12 | End: 2022-07-12

## 2022-07-11 RX ADMIN — PANTOPRAZOLE SODIUM 40 MILLIGRAM(S): 20 TABLET, DELAYED RELEASE ORAL at 12:17

## 2022-07-11 RX ADMIN — FAMOTIDINE 20 MILLIGRAM(S): 10 INJECTION INTRAVENOUS at 07:09

## 2022-07-11 RX ADMIN — Medication 15 MILLIGRAM(S): at 15:48

## 2022-07-11 RX ADMIN — ENOXAPARIN SODIUM 40 MILLIGRAM(S): 100 INJECTION SUBCUTANEOUS at 06:24

## 2022-07-11 RX ADMIN — HYDROMORPHONE HYDROCHLORIDE 1 MILLIGRAM(S): 2 INJECTION INTRAMUSCULAR; INTRAVENOUS; SUBCUTANEOUS at 09:32

## 2022-07-11 RX ADMIN — HYDROMORPHONE HYDROCHLORIDE 0.5 MILLIGRAM(S): 2 INJECTION INTRAMUSCULAR; INTRAVENOUS; SUBCUTANEOUS at 10:40

## 2022-07-11 RX ADMIN — ONDANSETRON 4 MILLIGRAM(S): 8 TABLET, FILM COATED ORAL at 21:18

## 2022-07-11 RX ADMIN — SODIUM CHLORIDE 125 MILLILITER(S): 9 INJECTION, SOLUTION INTRAVENOUS at 12:17

## 2022-07-11 RX ADMIN — ATORVASTATIN CALCIUM 20 MILLIGRAM(S): 80 TABLET, FILM COATED ORAL at 21:20

## 2022-07-11 RX ADMIN — SCOPALAMINE 1 PATCH: 1 PATCH, EXTENDED RELEASE TRANSDERMAL at 06:25

## 2022-07-11 RX ADMIN — HYDROMORPHONE HYDROCHLORIDE 0.5 MILLIGRAM(S): 2 INJECTION INTRAMUSCULAR; INTRAVENOUS; SUBCUTANEOUS at 10:25

## 2022-07-11 RX ADMIN — Medication 400 MILLIGRAM(S): at 21:18

## 2022-07-11 NOTE — CHART NOTE - NSCHARTNOTEFT_GEN_A_CORE
PACU ANESTHESIA ADMISSION NOTE      Procedure: Laparoscopic sleeve gastrectomy    Upper endoscopy    Block, transversus abdominis plane, bilateral      Post op diagnosis:  Class 2 severe obesity with serious comorbidity in adult        ____  Intubated  TV:______       Rate: ______      FiO2: ______    __x__  Patent Airway    __x__  Full return of protective reflexes    __x__  Full recovery from anesthesia / back to baseline status    Vitals:  T(C): 36.9 (07-11-22 @ 07:09), Max: 36.9 (07-11-22 @ 06:17)  HR: 95 (07-11-22 @ 07:09) (95 - 95)  BP: 129/81 (07-11-22 @ 07:09) (129/81 - 129/81)  RR: 17 (07-11-22 @ 07:09) (17 - 17)  SpO2: --    Mental Status:  __x__ Awake   ___x__ Alert   _____ Drowsy   _____ Sedated    Nausea/Vomiting:  __x__ NO  ______Yes,   See Post - Op Orders          Pain Scale (0-10):  __0___    Treatment: ____ None    __x__ See Post - Op/PCA Orders    Post - Operative Fluids:   ____ Oral   __x__ See Post - Op Orders    Plan: Discharge:   ____Home       _____Floor     ___x__Critical Care (SDU)    _____  Other:_________________    Comments: Patient had smooth intraoperative event, no anesthesia complication.  PACU Vital signs: HR:  90          BP:    129    /   72       RR:  23           O2 Sat:    99   %     Temp 99.2F

## 2022-07-11 NOTE — CHART NOTE - NSCHARTNOTEFT_GEN_A_CORE
GENERAL SURGERY PROGRESS NOTE    Patient: DARREN VANCE , 47y ([birth year])Male   MRN: [id number]  Location: [address]  Visit: 07-11-22 Inpatient  Date: 07-11-22 @ 16:04    No acute issues post-operatively. Pain well controlled, patient is ambulating, denies nausea, vomiting, fever, or chills. Hemodynamically stable, afebrile, and voiding spontaneously. Patient reports consumption of 2oz bariatric clears. - flatus, - BM     PAST MEDICAL & SURGICAL HISTORY:  HTN (hypertension)  Obesity  Hypercholesteremia  GERD (gastroesophageal reflux disease)  History of cholecystectomy    Vitals:   T(F): 98 (07-11-22 @ 13:34), Max: 99.2 (07-11-22 @ 09:10)  HR: 85 (07-11-22 @ 13:34)  BP: 141/83 (07-11-22 @ 13:34)  RR: 16 (07-11-22 @ 12:00)  SpO2: 95% (07-11-22 @ 12:00)      Diet, Clear Liquid:   Bariatric Clear Liquid (BARICLLIQ)      Fluids: lactated ringers.: Solution, 1000 milliLiter(s) infuse at 125 mL/Hr      I & O's:      Bowel Movement: : [] YES [x] NO  Flatus: : [] YES [x] NO    PHYSICAL EXAM:  General: NAD, AAOx3, calm and cooperative  HEENT: NCAT  Cardiac: No peripheral cyanosis or pallor, extremities well perfused   Respiratory: Equal chest rise bilaterally, non-labored breathing   Abdomen: Soft, non-distended, appropriately tender to palpation site at incision sites.   Musculoskeletal: Strength 5/5 BL UE/LE, ROM intact, compartments soft  Neuro: At baseline, no focal deficits  Vascular: Pulses 2+ throughout, extremities well perfused  Skin: Warm/dry, normal color, no jaundice  Incision/wound: healing well, dressings in place, clean, dry and intact    MEDICATIONS  (STANDING):  acetaminophen   IVPB .. 1000 milliGRAM(s) IV Intermittent once  atorvastatin 20 milliGRAM(s) Oral at bedtime  lactated ringers. 1000 milliLiter(s) (125 mL/Hr) IV Continuous <Continuous>  losartan 25 milliGRAM(s) Oral daily  pantoprazole  Injectable 40 milliGRAM(s) IV Push daily    MEDICATIONS  (PRN):  acetaminophen   IVPB .. 1000 milliGRAM(s) IV Intermittent once PRN Moderate Pain (4 - 6)  gabapentin Solution 100 milliGRAM(s) Oral three times a day PRN pain  ketorolac   Injectable 15 milliGRAM(s) IV Push every 8 hours PRN Moderate Pain (4 - 6)  ondansetron Injectable 4 milliGRAM(s) IV Push every 6 hours PRN Nausea      DVT PROPHYLAXIS:   GI PROPHYLAXIS: pantoprazole  Injectable 40 milliGRAM(s) IV Push daily    ANTICOAGULATION:   ANTIBIOTICS:        Analysis: GENERAL SURGERY PROGRESS NOTE    Patient: DARREN VANCE , 47y ([birth year])Male   MRN: [id number]  Location: [address]  Visit: 07-11-22 Inpatient  Date: 07-11-22 @ 16:04    No acute issues post-operatively. Pain well controlled, patient is ambulating, denies nausea, vomiting, fever, or chills. Hemodynamically stable, afebrile, and voiding spontaneously. Patient reports consumption of 2oz bariatric clears. - flatus, - BM     PAST MEDICAL & SURGICAL HISTORY:  HTN (hypertension)  Obesity  Hypercholesteremia  GERD (gastroesophageal reflux disease)  History of cholecystectomy    Vitals:   T(F): 98 (07-11-22 @ 13:34), Max: 99.2 (07-11-22 @ 09:10)  HR: 85 (07-11-22 @ 13:34)  BP: 141/83 (07-11-22 @ 13:34)  RR: 16 (07-11-22 @ 12:00)  SpO2: 95% (07-11-22 @ 12:00)      Diet, Clear Liquid:   Bariatric Clear Liquid (BARICLLIQ)      Fluids: lactated ringers.: Solution, 1000 milliLiter(s) infuse at 125 mL/Hr      I & O's:      Bowel Movement: : [] YES [x] NO  Flatus: : [] YES [x] NO    PHYSICAL EXAM:  General: NAD, AAOx3, calm and cooperative    Assessment:     HEENT: NCAT  Cardiac: No peripheral cyanosis or pallor, extremities well perfused   Respiratory: Equal chest rise bilaterally, non-labored breathing   Abdomen: Soft, non-distended, appropriately tender to palpation site at incision sites.   Musculoskeletal: Strength 5/5 BL UE/LE, ROM intact, compartments soft  Neuro: At baseline, no focal deficits  Vascular: Pulses 2+ throughout, extremities well perfused  Skin: Warm/dry, normal color, no jaundice  Incision/wound: healing well, dressings in place, clean, dry and intact    MEDICATIONS  (STANDING):  acetaminophen   IVPB .. 1000 milliGRAM(s) IV Intermittent once  atorvastatin 20 milliGRAM(s) Oral at bedtime  lactated ringers. 1000 milliLiter(s) (125 mL/Hr) IV Continuous <Continuous>  losartan 25 milliGRAM(s) Oral daily  pantoprazole  Injectable 40 milliGRAM(s) IV Push daily    MEDICATIONS  (PRN):  acetaminophen   IVPB .. 1000 milliGRAM(s) IV Intermittent once PRN Moderate Pain (4 - 6)  gabapentin Solution 100 milliGRAM(s) Oral three times a day PRN pain  ketorolac   Injectable 15 milliGRAM(s) IV Push every 8 hours PRN Moderate Pain (4 - 6)  ondansetron Injectable 4 milliGRAM(s) IV Push every 6 hours PRN Nausea      DVT PROPHYLAXIS:   GI PROPHYLAXIS: pantoprazole  Injectable 40 milliGRAM(s) IV Push daily    ANTICOAGULATION:   ANTIBIOTICS:        Assessment:     46 y/o M w/ mhx of obesity, HTN, HLD s/p elective laparoscopic sleeve gastrectomy. Patient tolerated procedure well with no issues.     - Multimodal pain control  - Encourage IS, ambulation   - Continuous hemodynamic monitoring   - Maintain SpO2 >92%   - Bariatric Clears (patient tolerated two 1oz cups)   - Denies nausea, vomiting  - (-) flatus, (+) BM   - Maintain UOP >0.5cc/kg/hr  - Replete electrolytes PRN   - Strict I/Os   - Mechanical and chemical DVT ppx   - OOBTC   - REST OF CARE PER SICU GENERAL SURGERY PROGRESS NOTE    Patient: DARREN VANCE , 47y ([birth year])Male   MRN: [id number]  Location: [address]  Visit: 07-11-22 Inpatient  Date: 07-11-22 @ 16:04    No acute issues post-operatively. Pain well controlled, patient is ambulating, denies nausea, vomiting, fever, or chills. Hemodynamically stable, afebrile, and voiding spontaneously. Patient reports consumption of 2oz bariatric clears. - flatus, - BM     PAST MEDICAL & SURGICAL HISTORY:  HTN (hypertension)  Obesity  Hypercholesteremia  GERD (gastroesophageal reflux disease)  History of cholecystectomy    Vitals:   T(F): 98 (07-11-22 @ 13:34), Max: 99.2 (07-11-22 @ 09:10)  HR: 85 (07-11-22 @ 13:34)  BP: 141/83 (07-11-22 @ 13:34)  RR: 16 (07-11-22 @ 12:00)  SpO2: 95% (07-11-22 @ 12:00)      Diet, Clear Liquid:   Bariatric Clear Liquid (BARICLLIQ)      Fluids: lactated ringers.: Solution, 1000 milliLiter(s) infuse at 125 mL/Hr      I & O's:      Bowel Movement: : [] YES [x] NO  Flatus: : [] YES [x] NO    PHYSICAL EXAM:  General: NAD, AAOx3, calm and cooperative    Assessment:     HEENT: NCAT  Cardiac: No peripheral cyanosis or pallor, extremities well perfused   Respiratory: Equal chest rise bilaterally, non-labored breathing   Abdomen: Soft, non-distended, appropriately tender to palpation site at incision sites.   Musculoskeletal: Strength 5/5 BL UE/LE, ROM intact, compartments soft  Neuro: At baseline, no focal deficits  Vascular: Pulses 2+ throughout, extremities well perfused  Skin: Warm/dry, normal color, no jaundice  Incision/wound: healing well, dressings in place, clean, dry and intact    MEDICATIONS  (STANDING):  acetaminophen   IVPB .. 1000 milliGRAM(s) IV Intermittent once  atorvastatin 20 milliGRAM(s) Oral at bedtime  lactated ringers. 1000 milliLiter(s) (125 mL/Hr) IV Continuous <Continuous>  losartan 25 milliGRAM(s) Oral daily  pantoprazole  Injectable 40 milliGRAM(s) IV Push daily    MEDICATIONS  (PRN):  acetaminophen   IVPB .. 1000 milliGRAM(s) IV Intermittent once PRN Moderate Pain (4 - 6)  gabapentin Solution 100 milliGRAM(s) Oral three times a day PRN pain  ketorolac   Injectable 15 milliGRAM(s) IV Push every 8 hours PRN Moderate Pain (4 - 6)  ondansetron Injectable 4 milliGRAM(s) IV Push every 6 hours PRN Nausea      DVT PROPHYLAXIS:   GI PROPHYLAXIS: pantoprazole  Injectable 40 milliGRAM(s) IV Push daily    ANTICOAGULATION:   ANTIBIOTICS:        Assessment:     46 y/o M w/ mhx of obesity, HTN, HLD s/p elective laparoscopic sleeve gastrectomy. Patient tolerated procedure well with no issues.     - Multimodal pain control  - Encourage IS, ambulation   - Continuous hemodynamic monitoring   - Maintain SpO2 >92%   - Bariatric Clears (patient tolerated two 1oz cups)   - Denies nausea, vomiting  - (-) flatus, (-) BM   - Maintain UOP >0.5cc/kg/hr  - Replete electrolytes PRN   - Strict I/Os   - Mechanical and chemical DVT ppx   - OOBTC   - REST OF CARE PER SICU

## 2022-07-11 NOTE — CONSULT NOTE ADULT - ASSESSMENT
Assessment & Plan  47y male with PMHx of HTN, HLD, TATIANNA (not on CPAP), morbid obesity with BMI of 38, s/p lap sleeve gastrectomy with b/l TAP block, and negative leak test via upper endoscopy.    NEURO:    Acute pain-controlled with tylenol, toradol, gabapentin  -Dilaudid if still uncontrolled     RESP:   #TATIANNA (not on CPAP)    Oxygen insufficiency-wean off 3L NC to RA as tolerate    Activity- OOB/ambulate    -Incentive spirometer  -f/up CXR      CARDS:   #HTN/HLD  -continue losartan 25 daily  -continue statin  -ECG: NSR @ 84 bpm, QTc 458    GI/NUTR:   #s/p lap sleeve gastrectomy, b/l TAP block    Diet: start Tian phase I as per protocol    GI Prophylaxis- PPI    Bowel regimen- none    /RENAL:        Monitor UO- TOV post-op  IVF: LR @ 125/'hr    Labs:          BUN/Cr- pending tonight          Electrolytes-pending tonight    HEME/ONC:       DVT prophylaxis-Lovenox to start tomorrow (got this morning), SCDs    Labs: Hb/Hct:  pending tonight                 Plts:    pending tonight              PTT/INR:          ID:  WBC-pending tonight  Temp trend- 24hrs T(F): 99.2 (07-11 @ 09:10), Max: 99.2 (07-11 @ 09:10)  Antibiotics- Cefotetan mayra-op       ENDO:    -FS qh while NPO     HA1C in  am     LINES/DRAINS:  PIV    DISPO:    SDU  -approved by Dr. Gonzalez Assessment & Plan  47y male with PMHx of HTN, HLD, TATIANNA (not on CPAP), morbid obesity with BMI of 38, s/p lap sleeve gastrectomy with b/l TAP block, and negative leak test via upper endoscopy.    NEURO:    Acute pain-controlled with tylenol, toradol, gabapentin  -Dilaudid if still uncontrolled     RESP:   #TATIANNA (not on CPAP)    Oxygen insufficiency-wean off 3L NC to RA as tolerate    Activity- OOB/ambulate    -Incentive spirometer  -f/up CXR      CARDS:   #HTN/HLD  -continue losartan 25 daily  -continue statin  -ECG: NSR @ 84 bpm, QTc 458    GI/NUTR:   #s/p lap sleeve gastrectomy, b/l TAP block  #GERD    Diet: start Tian phase I as per protocol    GI Prophylaxis- PPI    Bowel regimen- none    /RENAL:        Monitor UO- TOV post-op  IVF: LR @ 125/'hr    Labs:          BUN/Cr- pending tonight          Electrolytes-pending tonight    HEME/ONC:       DVT prophylaxis-Lovenox to start tomorrow (got this morning), SCDs    Labs: Hb/Hct:  pending tonight                 Plts:    pending tonight              PTT/INR:          ID:  WBC-pending tonight  Temp trend- 24hrs T(F): 99.2 (07-11 @ 09:10), Max: 99.2 (07-11 @ 09:10)  Antibiotics- Cefotetan mayra-op       ENDO:    -FS qh while NPO     HA1C in  am     LINES/DRAINS:  PIV    DISPO:    SDU  -approved by Dr. Gonzalez

## 2022-07-11 NOTE — BRIEF OPERATIVE NOTE - NSICDXBRIEFPOSTOP_GEN_ALL_CORE_FT
POST-OP DIAGNOSIS:  Class 2 severe obesity with serious comorbidity in adult 11-Jul-2022 09:12:03  Sabine Purcell

## 2022-07-11 NOTE — BRIEF OPERATIVE NOTE - NSICDXBRIEFPROCEDURE_GEN_ALL_CORE_FT
PROCEDURES:  Laparoscopic sleeve gastrectomy 11-Jul-2022 09:11:28  Sabine Purcell  Upper endoscopy 11-Jul-2022 09:11:35  Sabine Purcell, transversus abdominis plane, bilateral 11-Jul-2022 09:11:41  Sabine Purcell

## 2022-07-11 NOTE — CONSULT NOTE ADULT - ATTENDING COMMENTS
I examined the patient with the PA/resident and discussed my plan with the Resident/PA.  I personally provided over 30 minutes of direct critical care to this patient. I agree with the above resident/pa note unless directly contradicted below.     DARREN VANCE 47y Male BMI __s/p elective laparoscopic sleeve gastrectomy with b/l TAP block with no intraop complications.      PLAN:    NEUROLOGIC:   #Post op Pain   - controlled with IV Tylenol, Gabapentin, Toradol    RESPIRATORY:   #morbid obesity hypoventilation TATIANNA requiring CPAP   - bedside set at 10 cm H20    CARDIOVASCULAR:   # high risk for hemodynamic deterioration due to morbid obesity s/p gastric surgery   - continue cardiac monitor x 24 hours     GASTROINTESTIONAL/NUTRITION:     #s/p gastric surgery   Diet, NPO  GI ppx: PPI  Bowel regimen once taking po meds    RENAL/GENITOURINARY:   No Anderson, pending post-op void, will bladder scan if necessary  Cr at baseline  F/u BMP , CBC     DVT ppx: HSQ    # Periop ABX  -- Ancef x2 doses      Tubes/Lines/Drains  ***  [x] Peripheral IV    ENDOCRINE:   #type 2 DM   - FS q4 while in ICU      DISPOSITION: Admit to SICU for hemodynamic monitoring s/p gastric surgery

## 2022-07-11 NOTE — CONSULT NOTE ADULT - SUBJECTIVE AND OBJECTIVE BOX
SICU Consultation Note  =====================================================  HPI:   47y male with PMHx of HTN, HLD, TATIANNA (not on CPAP), morbid obesity with BMI of 38, with unsuccesful attempts at weight loss, who presents electively today, s/p lap sleeve gastrectomy with b/l TAP block, and negative leak test via upper endoscopy.  No hemodynamic instability intra-op, successfully extubated post-op.  SICU/SDU called for respiratory monitoring post-op in the setting of morbid obesity.    Surgery Information  OR time:      EBL:       UO:             IV Fluids:       Blood Products:             PAST MEDICAL & SURGICAL HISTORY:  HTN (hypertension)    Obesity    Hypercholesteremia    GERD (gastroesophageal reflux disease)    History of cholecystectomy      Allergies  No Known Allergies    Home Medications:  atorvastatin 20 mg oral tablet: 1 tab(s) orally once a day (11 Jul 2022 06:15)  fenofibrate 160 mg oral tablet: 1 tab(s) orally once a day (11 Jul 2022 06:15)  losartan 25 mg oral tablet: 1 tab(s) orally once a day (11 Jul 2022 06:15)  omeprazole 40 mg oral delayed release capsule: 1 cap(s) orally once a day (11 Jul 2022 06:15)  Vascepa 1 g oral capsule: 2 cap(s) orally 2 times a day (11 Jul 2022 06:15)    Advanced Directives: Full Code     ROS:  [x ] A ten-point review of systems was otherwise negative except as noted.  [ ] Due to altered mental status/intubation, subjective information were not able to be obtained from the patient. History was obtained, to the extent possible, from review of the chart and collateral sources of information.      CURRENT MEDICATIONS:   --------------------------------------------------------------------------------------  Neurologic Medications  acetaminophen   IVPB .. 1000 milliGRAM(s) IV Intermittent once PRN Moderate Pain (4 - 6)  acetaminophen   IVPB .. 1000 milliGRAM(s) IV Intermittent once  gabapentin Solution 100 milliGRAM(s) Oral three times a day PRN pain  HYDROmorphone  Injectable 0.5 milliGRAM(s) IV Push every 10 minutes PRN Moderate Pain (4 - 6)  ketorolac   Injectable 15 milliGRAM(s) IV Push every 8 hours PRN Moderate Pain (4 - 6)  metoclopramide Injectable 10 milliGRAM(s) IV Push once PRN Nausea and/or Vomiting  ondansetron Injectable 4 milliGRAM(s) IV Push every 6 hours PRN Nausea    Respiratory Medications    Cardiovascular Medications  losartan 25 milliGRAM(s) Oral daily    Gastrointestinal Medications  lactated ringers. 1000 milliLiter(s) IV Continuous <Continuous>  lactated ringers. 1000 milliLiter(s) IV Continuous <Continuous>  pantoprazole  Injectable 40 milliGRAM(s) IV Push daily    Genitourinary Medications    Hematologic/Oncologic Medications    Antimicrobial/Immunologic Medications    Endocrine/Metabolic Medications  atorvastatin 20 milliGRAM(s) Oral at bedtime    Topical/Other Medications  BUpivacaine liposome 1.3% Injectable 20 milliLiter(s) Local Injection once    --------------------------------------------------------------------------------------    Vital Signs Last 24 Hrs  T(C): 37.3 (11 Jul 2022 09:10), Max: 37.3 (11 Jul 2022 09:10)  T(F): 99.2 (11 Jul 2022 09:10), Max: 99.2 (11 Jul 2022 09:10)  HR: 76 (11 Jul 2022 10:20) (76 - 95)  BP: 132/83 (11 Jul 2022 10:20) (129/72 - 155/77)  BP(mean): --  RR: 17 (11 Jul 2022 10:20) (16 - 17)  SpO2: 95% (11 Jul 2022 10:20) (95% - 98%)    Parameters below as of 11 Jul 2022 10:20  Patient On (Oxygen Delivery Method): nasal cannula  O2 Flow (L/min): 2      LABS:  pending tonight          EXAM:  General/Neuro  GCS: 15  Exam: Normal, NAD, alert, oriented x 3, no focal deficits. PERRLA  ***    Respiratory  Exam: Lungs clear to auscultation, Normal expansion/effort.      Cardiovascular  Exam: S1, S2.  Regular rate and rhythm.   Cardiac Rhythm: Normal Sinus Rhythm     GI  Exam: Abdomen soft, mildly tender, Non-distended.    wound: 5 lap band sites c/d/i    Extremities  Exam: Extremities warm, well-perfused.  No Peripheral edema b/l LE    Derm:  Exam: Good skin turgor, no skin breakdown.      :   Exam: No Anderson catheter.    Tubes/Lines/Drains  ***  [x] Peripheral IV           SICU Consultation Note  =====================================================  HPI:   47y male with PMHx of HTN, HLD, GERD, TATIANNA (not on CPAP), morbid obesity with BMI of 38, with unsuccesful attempts at weight loss, who presents electively today, s/p lap sleeve gastrectomy with b/l TAP block, and negative leak test via upper endoscopy.  No hemodynamic instability intra-op, successfully extubated post-op.  SICU/SDU called for respiratory monitoring post-op in the setting of morbid obesity.    Surgery Information  OR time:  1.5hrs    EBL: 10      UO: no nguyen            IV Fluids:  1.2L     Blood Products:  none          PAST MEDICAL & SURGICAL HISTORY:  HTN (hypertension)    Obesity    Hypercholesteremia    GERD (gastroesophageal reflux disease)    History of cholecystectomy      Allergies  No Known Allergies    Home Medications:  atorvastatin 20 mg oral tablet: 1 tab(s) orally once a day (11 Jul 2022 06:15)  fenofibrate 160 mg oral tablet: 1 tab(s) orally once a day (11 Jul 2022 06:15)  losartan 25 mg oral tablet: 1 tab(s) orally once a day (11 Jul 2022 06:15)  omeprazole 40 mg oral delayed release capsule: 1 cap(s) orally once a day (11 Jul 2022 06:15)  Vascepa 1 g oral capsule: 2 cap(s) orally 2 times a day (11 Jul 2022 06:15)    Advanced Directives: Full Code     ROS:  [x ] A ten-point review of systems was otherwise negative except as noted.  [ ] Due to altered mental status/intubation, subjective information were not able to be obtained from the patient. History was obtained, to the extent possible, from review of the chart and collateral sources of information.      CURRENT MEDICATIONS:   --------------------------------------------------------------------------------------  Neurologic Medications  acetaminophen   IVPB .. 1000 milliGRAM(s) IV Intermittent once PRN Moderate Pain (4 - 6)  acetaminophen   IVPB .. 1000 milliGRAM(s) IV Intermittent once  gabapentin Solution 100 milliGRAM(s) Oral three times a day PRN pain  HYDROmorphone  Injectable 0.5 milliGRAM(s) IV Push every 10 minutes PRN Moderate Pain (4 - 6)  ketorolac   Injectable 15 milliGRAM(s) IV Push every 8 hours PRN Moderate Pain (4 - 6)  metoclopramide Injectable 10 milliGRAM(s) IV Push once PRN Nausea and/or Vomiting  ondansetron Injectable 4 milliGRAM(s) IV Push every 6 hours PRN Nausea    Respiratory Medications    Cardiovascular Medications  losartan 25 milliGRAM(s) Oral daily    Gastrointestinal Medications  lactated ringers. 1000 milliLiter(s) IV Continuous <Continuous>  lactated ringers. 1000 milliLiter(s) IV Continuous <Continuous>  pantoprazole  Injectable 40 milliGRAM(s) IV Push daily    Genitourinary Medications    Hematologic/Oncologic Medications    Antimicrobial/Immunologic Medications    Endocrine/Metabolic Medications  atorvastatin 20 milliGRAM(s) Oral at bedtime    Topical/Other Medications  BUpivacaine liposome 1.3% Injectable 20 milliLiter(s) Local Injection once    --------------------------------------------------------------------------------------    Vital Signs Last 24 Hrs  T(C): 37.3 (11 Jul 2022 09:10), Max: 37.3 (11 Jul 2022 09:10)  T(F): 99.2 (11 Jul 2022 09:10), Max: 99.2 (11 Jul 2022 09:10)  HR: 76 (11 Jul 2022 10:20) (76 - 95)  BP: 132/83 (11 Jul 2022 10:20) (129/72 - 155/77)  BP(mean): --  RR: 17 (11 Jul 2022 10:20) (16 - 17)  SpO2: 95% (11 Jul 2022 10:20) (95% - 98%)    Parameters below as of 11 Jul 2022 10:20  Patient On (Oxygen Delivery Method): nasal cannula  O2 Flow (L/min): 2      LABS:  pending tonight          EXAM:  General/Neuro  GCS: 15  Exam: Normal, NAD, alert, oriented x 3, no focal deficits. PERRLA  ***    Respiratory  Exam: Lungs clear to auscultation, Normal expansion/effort.      Cardiovascular  Exam: S1, S2.  Regular rate and rhythm.   Cardiac Rhythm: Normal Sinus Rhythm     GI  Exam: Abdomen soft, mildly tender, Non-distended.    wound: 5 lap band sites c/d/i    Extremities  Exam: Extremities warm, well-perfused.  No Peripheral edema b/l LE    Derm:  Exam: Good skin turgor, no skin breakdown.      :   Exam: No Nguyen catheter.    Tubes/Lines/Drains  ***  [x] Peripheral IV

## 2022-07-11 NOTE — BRIEF OPERATIVE NOTE - NSICDXBRIEFPREOP_GEN_ALL_CORE_FT
PRE-OP DIAGNOSIS:  Class 2 severe obesity with serious comorbidity in adult 11-Jul-2022 09:11:53  Sabine Purcell

## 2022-07-12 ENCOUNTER — TRANSCRIPTION ENCOUNTER (OUTPATIENT)
Age: 47
End: 2022-07-12

## 2022-07-12 VITALS
HEART RATE: 72 BPM | TEMPERATURE: 98 F | OXYGEN SATURATION: 97 % | DIASTOLIC BLOOD PRESSURE: 69 MMHG | SYSTOLIC BLOOD PRESSURE: 125 MMHG | RESPIRATION RATE: 17 BRPM

## 2022-07-12 PROCEDURE — 71045 X-RAY EXAM CHEST 1 VIEW: CPT | Mod: 26

## 2022-07-12 PROCEDURE — 99232 SBSQ HOSP IP/OBS MODERATE 35: CPT | Mod: 24,25

## 2022-07-12 RX ORDER — MAGNESIUM SULFATE 500 MG/ML
2 VIAL (ML) INJECTION ONCE
Refills: 0 | Status: COMPLETED | OUTPATIENT
Start: 2022-07-12 | End: 2022-07-12

## 2022-07-12 RX ORDER — ONDANSETRON 8 MG/1
1 TABLET, FILM COATED ORAL
Qty: 9 | Refills: 0
Start: 2022-07-12 | End: 2022-07-14

## 2022-07-12 RX ADMIN — LOSARTAN POTASSIUM 25 MILLIGRAM(S): 100 TABLET, FILM COATED ORAL at 07:38

## 2022-07-12 RX ADMIN — Medication 15 MILLIGRAM(S): at 00:56

## 2022-07-12 RX ADMIN — SODIUM CHLORIDE 125 MILLILITER(S): 9 INJECTION, SOLUTION INTRAVENOUS at 07:58

## 2022-07-12 RX ADMIN — ENOXAPARIN SODIUM 40 MILLIGRAM(S): 100 INJECTION SUBCUTANEOUS at 07:38

## 2022-07-12 RX ADMIN — SCOPALAMINE 1 PATCH: 1 PATCH, EXTENDED RELEASE TRANSDERMAL at 07:56

## 2022-07-12 RX ADMIN — Medication 15 MILLIGRAM(S): at 09:20

## 2022-07-12 RX ADMIN — PANTOPRAZOLE SODIUM 40 MILLIGRAM(S): 20 TABLET, DELAYED RELEASE ORAL at 11:15

## 2022-07-12 RX ADMIN — Medication 15 MILLIGRAM(S): at 09:05

## 2022-07-12 RX ADMIN — ONDANSETRON 4 MILLIGRAM(S): 8 TABLET, FILM COATED ORAL at 06:41

## 2022-07-12 RX ADMIN — Medication 25 GRAM(S): at 02:33

## 2022-07-12 NOTE — DISCHARGE NOTE PROVIDER - NSDCFUSCHEDAPPT_GEN_ALL_CORE_FT
Kristy Bermudez [facility]  GENSURG [address]  Scheduled Appointment: 07/20/2022    Ana Rosa Spears  [place] [facility]  UROLOGY [address]  Scheduled Appointment: 09/06/2022

## 2022-07-12 NOTE — DISCHARGE NOTE PROVIDER - NSDCCPTREATMENT_GEN_ALL_CORE_FT
PRINCIPAL PROCEDURE  Procedure: Laparoscopic sleeve gastrectomy  Findings and Treatment: Lsleeve gastrectomy with b/l TAP block, and negative leak test via upper endoscopy.  No hemodynamic instability intra-op, successfully extubated post-op.

## 2022-07-12 NOTE — DISCHARGE NOTE PROVIDER - NSDCMRMEDTOKEN_GEN_ALL_CORE_FT
atorvastatin 20 mg oral tablet: 1 tab(s) orally once a day  fenofibrate 160 mg oral tablet: 1 tab(s) orally once a day  losartan 25 mg oral tablet: 1 tab(s) orally once a day  omeprazole 40 mg oral delayed release capsule: 1 cap(s) orally once a day  Vascepa 1 g oral capsule: 2 cap(s) orally 2 times a day  Zofran 4 mg oral tablet: 1 tab(s) orally every 8 hours, As Needed -for nausea

## 2022-07-12 NOTE — DISCHARGE NOTE NURSING/CASE MANAGEMENT/SOCIAL WORK - PATIENT PORTAL LINK FT
You can access the FollowMyHealth Patient Portal offered by [facility] by registering at the following website: [email or web address]. By joining [facility]’s FollowMyHealth portal, you will also be able to view your health information using other applications (apps) compatible with our system.

## 2022-07-12 NOTE — PROGRESS NOTE ADULT - SUBJECTIVE AND OBJECTIVE BOX
Surgery Progress Note       Patient: DARREN VANCE , 47y ([birth year])Male   MRN: [id number]  Location: [address]  Visit: 07-11-22 Inpatient  Date: 07-12-22 @ 08:22        Admitted :07-11-22 (1d)  LOS: 1d    Procedure/Dx/Injuries: POD1 lap sleeve     Events of past 24 hours: C/o intermittent nausea and epigastric pain that is better this morning. Zofran helped. No emesis. Madeline 4oz/hr, Walking, voiding.       Vitals:   T(F): 98.1 (07-12-22 @ 08:00), Max: 99.2 (07-11-22 @ 09:10)  HR: 74 (07-12-22 @ 08:00)  BP: 124/71 (07-12-22 @ 08:00)  RR: 16 (07-11-22 @ 12:00)  SpO2: 91% (07-12-22 @ 08:00)      Diet, Clear Liquid:   Bariatric Clear Liquid (BARICLLIQ)      Fluids: lactated ringers.: Solution, 1000 milliLiter(s) infuse at 125 mL/Hr      I & O's:    07-11-22 @ 07:01  -  07-12-22 @ 07:00  --------------------------------------------------------  IN:    Lactated Ringers: 1875 mL  Total IN: 1875 mL    OUT:    Voided (mL): 650 mL  Total OUT: 650 mL    Total NET: 1225 mL            PHYSICAL EXAM:  General Appearance: NAD  HEENT: Normocephalic  Heart:rrr  Lungs: No increased work of breathing or accessory muscle use.  Abdomen:  Soft,approp min ttp  MSK/Extremities: Warm & well-perfused.   Skin: Warm, dry. No jaundice.   Incision/wound: healing well, clean, dry and intact    MEDICATIONS  (STANDING):  acetaminophen   IVPB .. 1000 milliGRAM(s) IV Intermittent once  atorvastatin 20 milliGRAM(s) Oral at bedtime  enoxaparin Injectable 40 milliGRAM(s) SubCutaneous every 24 hours  lactated ringers. 1000 milliLiter(s) (125 mL/Hr) IV Continuous <Continuous>  losartan 25 milliGRAM(s) Oral daily  pantoprazole  Injectable 40 milliGRAM(s) IV Push daily    MEDICATIONS  (PRN):  gabapentin Solution 100 milliGRAM(s) Oral three times a day PRN pain  ketorolac   Injectable 15 milliGRAM(s) IV Push every 8 hours PRN Moderate Pain (4 - 6)  ondansetron Injectable 4 milliGRAM(s) IV Push every 6 hours PRN Nausea      DVT PROPHYLAXIS: enoxaparin Injectable 40 milliGRAM(s) SubCutaneous every 24 hours    GI PROPHYLAXIS: pantoprazole  Injectable 40 milliGRAM(s) IV Push daily    ANTICOAGULATION:   ANTIBIOTICS:            LAB/STUDIES:  Labs:  CAPILLARY BLOOD GLUCOSE                              13.8   8.82  )-----------( 197      ( 11 Jul 2022 22:50 )             39.5         07-11    140  |  104  |  14  ----------------------------<  111<H>  4.0   |  22  |  1.1      Calcium, Total Serum: 8.7 mg/dL (07-11-22 @ 22:50)

## 2022-07-12 NOTE — DISCHARGE NOTE NURSING/CASE MANAGEMENT/SOCIAL WORK - NSDCPEFALRISK_GEN_ALL_CORE
For information on Fall & Injury Prevention, visit: [email or web address] OR  [email or web address] OR  https://www.cdc.gov/steadi/patient.html

## 2022-07-12 NOTE — PROGRESS NOTE ADULT - ASSESSMENT
A/P:   47y M POD1 lap sleeve gastrectomy  -Continue candelario clears  -PRN zofran  -Ambulate, IS  -DVT ppx, protonix  -Likely dc home today if continues to do well    Sabine Purcell MD  MIS Fellow    BLUE TEAM SPECTRA [id number]

## 2022-07-12 NOTE — PROGRESS NOTE ADULT - SUBJECTIVE AND OBJECTIVE BOX
DARREN VANCE  [id number]  47y Male    Indication for ICU admission: s/p lap sleeve gastrectomy, b/l TAP block    Admit Date: 07-11-22  ICU Date: 7/11/22  OR Date: 7/11/22    No Known Allergies    PAST MEDICAL & SURGICAL HISTORY:  HTN (hypertension)  Obesity  Hypercholesteremia  GERD (gastroesophageal reflux disease)  History of cholecystectomy      Home Medications:  atorvastatin 20 mg oral tablet: 1 tab(s) orally once a day (11 Jul 2022 06:15)  fenofibrate 160 mg oral tablet: 1 tab(s) orally once a day (11 Jul 2022 06:15)  losartan 25 mg oral tablet: 1 tab(s) orally once a day (11 Jul 2022 06:15)  omeprazole 40 mg oral delayed release capsule: 1 cap(s) orally once a day (11 Jul 2022 06:15)  Vascepa 1 g oral capsule: 2 cap(s) orally 2 times a day (11 Jul 2022 06:15)        24HRS EVENT:  7/11  Night  - Nausea- Zofran given  - 3 oz  - 8pm labs pending     Day:  -voided  -up to 2 oz      DVT PTX: Lovx 40    GI PTX:pantoprazole  Injectable 40 milliGRAM(s) IV Push daily      ***Tubes/Lines/Drains  ***  Peripheral IV      REVIEW OF SYSTEMS  [x ] A ten-point review of systems was otherwise negative except as noted.  [ ] Due to altered mental status/intubation, subjective information were not able to be obtained from the patient. History was obtained, to the extent possible, from review of the chart and collateral sources of information. DARREN VANCE  [id number]  47y Male    Indication for ICU admission: s/p lap sleeve gastrectomy, b/l TAP block    Admit Date: 07-11-22  ICU Date: 7/11/22  OR Date: 7/11/22    No Known Allergies    PAST MEDICAL & SURGICAL HISTORY:  HTN (hypertension)  Obesity  Hypercholesteremia  GERD (gastroesophageal reflux disease)  History of cholecystectomy      Home Medications:  atorvastatin 20 mg oral tablet: 1 tab(s) orally once a day (11 Jul 2022 06:15)  fenofibrate 160 mg oral tablet: 1 tab(s) orally once a day (11 Jul 2022 06:15)  losartan 25 mg oral tablet: 1 tab(s) orally once a day (11 Jul 2022 06:15)  omeprazole 40 mg oral delayed release capsule: 1 cap(s) orally once a day (11 Jul 2022 06:15)  Vascepa 1 g oral capsule: 2 cap(s) orally 2 times a day (11 Jul 2022 06:15)        24HRS EVENT:  7/11  Night  - Nausea- Zofran given  - 3 oz  - 8pm labs pending     Day:  -voided  -up to 2 oz      DVT PTX: Lovx 40    GI PTX:pantoprazole  Injectable 40 milliGRAM(s) IV Push daily      ***Tubes/Lines/Drains  ***  Peripheral IV      REVIEW OF SYSTEMS  [x ] A ten-point review of systems was otherwise negative except as noted.  [ ] Due to altered mental status/intubation, subjective information were not able to be obtained from the patient. History was obtained, to the extent possible, from review of the chart and collateral sources of information.        Diet, Clear Liquid:   Bariatric Clear Liquid (BARICLLIQ) (07-11-22 @ 09:17)      CURRENT MEDS:  Neurologic Medications  acetaminophen   IVPB .. 1000 milliGRAM(s) IV Intermittent once  gabapentin Solution 100 milliGRAM(s) Oral three times a day PRN pain  ketorolac   Injectable 15 milliGRAM(s) IV Push every 8 hours PRN Moderate Pain (4 - 6)  ondansetron Injectable 4 milliGRAM(s) IV Push every 6 hours PRN Nausea    Respiratory Medications    Cardiovascular Medications  losartan 25 milliGRAM(s) Oral daily    Gastrointestinal Medications  lactated ringers. 1000 milliLiter(s) IV Continuous <Continuous>  pantoprazole  Injectable 40 milliGRAM(s) IV Push daily    Genitourinary Medications    Hematologic/Oncologic Medications  enoxaparin Injectable 40 milliGRAM(s) SubCutaneous every 24 hours    Antimicrobial/Immunologic Medications    Endocrine/Metabolic Medications  atorvastatin 20 milliGRAM(s) Oral at bedtime    Topical/Other Medications      ICU Vital Signs Last 24 Hrs  T(C): 36.7 (12 Jul 2022 08:00), Max: 37.3 (11 Jul 2022 09:10)  T(F): 98.1 (12 Jul 2022 08:00), Max: 99.2 (11 Jul 2022 09:10)  HR: 74 (12 Jul 2022 08:00) (74 - 88)  BP: 124/71 (12 Jul 2022 08:00) (124/71 - 155/77)  BP(mean): 91 (12 Jul 2022 08:00) (91 - 106)  ABP: --  ABP(mean): --  RR: 16 (11 Jul 2022 12:00) (14 - 20)  SpO2: 91% (12 Jul 2022 08:00) (91% - 98%)    O2 Parameters below as of 12 Jul 2022 08:00  Patient On (Oxygen Delivery Method): room air            I&O's Summary    11 Jul 2022 07:01  -  12 Jul 2022 07:00  --------------------------------------------------------  IN: 1875 mL / OUT: 650 mL / NET: 1225 mL      I&O's Detail    11 Jul 2022 07:01  -  12 Jul 2022 07:00  --------------------------------------------------------  IN:    Lactated Ringers: 1875 mL  Total IN: 1875 mL    OUT:    Voided (mL): 650 mL  Total OUT: 650 mL    Total NET: 1225 mL          PHYSICAL EXAM:     a&ox3  follows commands, NASCIMENTO  no acute distress  equal chest rise b/l  abdomen soft, incision sites C/D/I  extremities soft

## 2022-07-12 NOTE — DISCHARGE NOTE PROVIDER - HOSPITAL COURSE
47y male with PMHx of HTN, HLD, GERD, TATIANNA (not on CPAP), morbid obesity with BMI of 38, with unsuccessfully attempts at weight loss, who presents electively today, s/p lap sleeve gastrectomy with b/l TAP block, and negative leak test via upper endoscopy.  No hemodynamic instability intra-op, successfully extubated post-op.  SICU/SDU called for respiratory monitoring post-op in the setting of morbid obesity.    Surgery Information  OR time:  1.5hrs    EBL: 10      UO: no nguyen            IV Fluids:  1.2L     Blood Products:  none       Pt evaluated at bedside AAOX3, NAD tolerating candelario clear diet, voiding and ambulating. Pt is ok for DC today with follow up with Dr Bermudez.

## 2022-07-12 NOTE — PROGRESS NOTE ADULT - ASSESSMENT
Assessment & Plan  47y male with PMHx of HTN, HLD, TATIANNA (not on CPAP), morbid obesity with BMI of 38, s/p lap sleeve gastrectomy with b/l TAP block, and negative leak test via upper endoscopy.    NEURO:    Acute pain-controlled with tylenol, toradol, gabapentin  -Dilaudid if still uncontrolled     RESP:   #TATIANNA (not on CPAP)    Oxygen insufficiency-wean off 3L NC to RA as tolerate    Activity- OOB/ambulate    -Incentive spirometer  -f/up CXR      CARDS:   #HTN/HLD  -continue losartan 25 daily  -continue statin  -ECG: NSR @ 84 bpm, QTc 458    GI/NUTR:   #s/p lap sleeve gastrectomy, b/l TAP block  #GERD    Diet: start Tian phase I as per protocol    GI Prophylaxis- PPI    Bowel regimen- none    /RENAL:        Monitor UO- passed TOV   IVF: LR @ 125/'hr    Labs:          BUN/Cr- 14/1.1  -->          Electrolytes-Na 140 // K 4.0 // Mg 1.8 //  Phos 3.5 (07-11 @ 22:50)      HEME/ONC:       DVT prophylaxis-Lovenox to start tomorrow (got this morning), SCDs    Labs: Hb/Hct:  13.8/39.5  -->                      Plts:  197  -->                 PTT/INR:        ID:  WBC- 8.82  --->>  Temp trend- 24hrs T(F): 98.3 (07-12 @ 00:09), Max: 99.2 (07-11 @ 09:10)  Max: 99.2 (07-11 @ 09:10)  Antibiotics- Cefotetan mayra-op       ENDO:    -FS qh while NPO     HA1C in  am     LINES/DRAINS:  PIV    DISPO:    SDU   Assessment & Plan  47y male with PMHx of HTN, HLD, TATIANNA (not on CPAP), morbid obesity with BMI of 38, s/p lap sleeve gastrectomy with b/l TAP block, and negative leak test via upper endoscopy.    NEURO:    Acute pain-controlled with tylenol, toradol, gabapentin  -Dilaudid if still uncontrolled     RESP:   #TATIANNA (not on CPAP)    Oxygen insufficiency-wean off 3L NC to RA as tolerate    Activity- OOB/ambulate    -Incentive spirometer  -f/up CXR      CARDS:   #HTN/HLD  -continue losartan 25 daily  -continue statin  -ECG: NSR @ 84 bpm, QTc 458    GI/NUTR:   #s/p lap sleeve gastrectomy, b/l TAP block  #GERD    Diet: start Tian phase I as per protocol    GI Prophylaxis- PPI    Bowel regimen- none    /RENAL:        Monitor UO- passed TOV   IVF: LR @ 125/'hr    Labs:          BUN/Cr- 14/1.1  -->          Electrolytes-Na 140 // K 4.0 // Mg 1.8 //  Phos 3.5 (07-11 @ 22:50)      HEME/ONC:       DVT prophylaxis-Lovenox to start tomorrow (got this morning), SCDs    Labs: Hb/Hct:  13.8/39.5  -->                      Plts:  197  -->                 PTT/INR:          ID:  WBC- 8.82  --->>  Temp trend- 24hrs T(F): 98.3 (07-12 @ 00:09), Max: 99.2 (07-11 @ 09:10)  Max: 99.2 (07-11 @ 09:10)  Antibiotics- Cefotetan mayra-op       ENDO:    -FS qh while NPO     HA1C in  am     LINES/DRAINS:  PIV    DISPO:    SDU

## 2022-07-12 NOTE — DISCHARGE NOTE PROVIDER - CARE PROVIDER_API CALL
Kristy Bermudez (MD)  Surgery  [address] [place] [address], [address] [place] [address]  Phone: [phone]  Fax: [phone]  Follow Up Time:

## 2022-07-12 NOTE — PROGRESS NOTE ADULT - ATTENDING COMMENTS
Pt. viktoria and examined, agree with above.  Tolerating 4 oz of bariatric clear liquids per hour.  Has episodes of nausea but no vomiting. Ambulating and voiding without difficulty.    AVSS  A&OX3, NAD  Abd soft, NT, ND, incisions C/D/I    Labs reviewed    s/p laparosocpic sleeve gastrectomy POD#1     D/C home today  Encourage ambulation  Pt has an appt to see me next week in my office for follow up  He has d/c instructions that were provided to him in my office at his last appointment  He has a number to call with questions / concerns. Pt. viktoria and examined, agree with above.  Tolerating 4 oz of bariatric clear liquids per hour.  Has episodes of nausea but no vomiting. Ambulating and voiding without difficulty.    AVSS  A&OX3, NAD  Abd soft, NT, ND, incisions C/D/I    Labs reviewed    s/p laparoscopic sleeve gastrectomy POD#1     D/C home today  Encourage ambulation  Will prescribe Zofran prn nausea at home  Pt has an appt to see me next week in my office for follow up  He has d/c instructions that were provided to him in my office at his last appointment  He has a number to call with questions / concerns.

## 2022-07-12 NOTE — DISCHARGE NOTE PROVIDER - NSDCCPCAREPLAN_GEN_ALL_CORE_FT
PRINCIPAL DISCHARGE DIAGNOSIS  Diagnosis: Morbid obesity  Assessment and Plan of Treatment: -Diet: Bariatric diet.   -Activity: Avoid heavy lifting or straining (anything over 10-15 pounds) for at least 6 weeks. You may ambulate and otherwise resume normal daily activities as tolerated. Your abdominal binder may help with some discomfort while ambulating. Please take home your incentive spirometer and continue to use 10x/hour while awake.  -Incisions: You may shower and allow soap and water to rinse over incisions. Please avoid scrubbing the areas and do not submerge your incisions in water for at least 2 weeks.  -Medications: You may resume your home medications.   -Follow-up: Please follow-up as previously scheduled with Dr. Bermudez.  -Please call the office or return to the ED with persistent fever greater than 100.4F, uncontrollable nausea/vomiting/abdominal pain, constipation, bloody bowel movements, abdominal distention, inability to tolerate oral intake.

## 2022-07-14 ENCOUNTER — NON-APPOINTMENT (OUTPATIENT)
Age: 47
End: 2022-07-14

## 2022-07-15 DIAGNOSIS — I10 ESSENTIAL (PRIMARY) HYPERTENSION: ICD-10-CM

## 2022-07-15 DIAGNOSIS — Z90.49 ACQUIRED ABSENCE OF OTHER SPECIFIED PARTS OF DIGESTIVE TRACT: ICD-10-CM

## 2022-07-15 DIAGNOSIS — E78.5 HYPERLIPIDEMIA, UNSPECIFIED: ICD-10-CM

## 2022-07-15 DIAGNOSIS — G89.18 OTHER ACUTE POSTPROCEDURAL PAIN: ICD-10-CM

## 2022-07-15 DIAGNOSIS — G47.33 OBSTRUCTIVE SLEEP APNEA (ADULT) (PEDIATRIC): ICD-10-CM

## 2022-07-15 DIAGNOSIS — K21.9 GASTRO-ESOPHAGEAL REFLUX DISEASE WITHOUT ESOPHAGITIS: ICD-10-CM

## 2022-07-15 DIAGNOSIS — E66.01 MORBID (SEVERE) OBESITY DUE TO EXCESS CALORIES: ICD-10-CM

## 2022-07-20 ENCOUNTER — APPOINTMENT (OUTPATIENT)
Dept: SURGERY | Facility: CLINIC | Age: 47
End: 2022-07-20

## 2022-07-20 ENCOUNTER — APPOINTMENT (OUTPATIENT)
Dept: UROLOGY | Facility: CLINIC | Age: 47
End: 2022-07-20

## 2022-07-20 VITALS
TEMPERATURE: 97 F | HEART RATE: 90 BPM | OXYGEN SATURATION: 97 % | DIASTOLIC BLOOD PRESSURE: 71 MMHG | WEIGHT: 269 LBS | BODY MASS INDEX: 37.66 KG/M2 | SYSTOLIC BLOOD PRESSURE: 101 MMHG | HEIGHT: 71 IN

## 2022-07-20 DIAGNOSIS — N40.1 BENIGN PROSTATIC HYPERPLASIA WITH LOWER URINARY TRACT SYMPMS: ICD-10-CM

## 2022-07-20 DIAGNOSIS — N13.8 BENIGN PROSTATIC HYPERPLASIA WITH LOWER URINARY TRACT SYMPMS: ICD-10-CM

## 2022-07-20 DIAGNOSIS — R39.9 UNSPECIFIED SYMPTOMS AND SIGNS INVOLVING THE GENITOURINARY SYSTEM: ICD-10-CM

## 2022-07-20 DIAGNOSIS — Z98.84 BARIATRIC SURGERY STATUS: ICD-10-CM

## 2022-07-20 PROBLEM — I10 ESSENTIAL (PRIMARY) HYPERTENSION: Chronic | Status: ACTIVE | Noted: 2022-06-23

## 2022-07-20 PROBLEM — E78.00 PURE HYPERCHOLESTEROLEMIA, UNSPECIFIED: Chronic | Status: ACTIVE | Noted: 2022-06-23

## 2022-07-20 PROBLEM — E66.9 OBESITY, UNSPECIFIED: Chronic | Status: ACTIVE | Noted: 2022-06-23

## 2022-07-20 PROBLEM — K21.9 GASTRO-ESOPHAGEAL REFLUX DISEASE WITHOUT ESOPHAGITIS: Chronic | Status: ACTIVE | Noted: 2022-06-23

## 2022-07-20 LAB — SURGICAL PATHOLOGY STUDY: SIGNIFICANT CHANGE UP

## 2022-07-20 PROCEDURE — 99213 OFFICE O/P EST LOW 20 MIN: CPT

## 2022-07-20 PROCEDURE — 99024 POSTOP FOLLOW-UP VISIT: CPT

## 2022-07-20 NOTE — HISTORY OF PRESENT ILLNESS
[FreeTextEntry1] : 47-year-old with weak urinary flow, nocturia x0-1, rare intermittency, rare straining, rare daytime frequency, no urgency.  No recent PSA.  His uncle had prostate cancer.

## 2022-07-20 NOTE — ASSESSMENT
[___ Days Post Op] : [unfilled] days [de-identified] : See our dietician today\par Try switching PPI to the evening to see if it helps with nighttime GERD.  Take it BID as needed.\par He was encouraged to attend support groups\par Miralax for constipation.  I told him to take it if it becomes 3-4 days since his last bowel movement.\par F/u in 3 weeks\par Call sooner with questions or concerns.\par \par **Official pathology report is pending.  Dr. Garcia (pathologist) called me and said benign nonspecific changes were seen in the specimen.  Will need to f/u on official results*

## 2022-07-20 NOTE — PHYSICAL EXAM
[de-identified] : anicteric sclera [de-identified] : breathing comfortably [de-identified] : soft, NT, ND [de-identified] : healing well

## 2022-07-20 NOTE — ASSESSMENT
[Urinary Symptom or Sign (788.99\R39.89)] : implantation [FreeTextEntry1] : Patient not bothered by his slow urinary stream and wants no treatment.  We will get new PSA.

## 2022-07-25 LAB — PSA SERPL-MCNC: 1.82 NG/ML

## 2022-08-10 ENCOUNTER — APPOINTMENT (OUTPATIENT)
Dept: SURGERY | Facility: CLINIC | Age: 47
End: 2022-08-10

## 2022-08-10 VITALS
OXYGEN SATURATION: 97 % | BODY MASS INDEX: 35.98 KG/M2 | SYSTOLIC BLOOD PRESSURE: 112 MMHG | DIASTOLIC BLOOD PRESSURE: 82 MMHG | TEMPERATURE: 97.1 F | WEIGHT: 257 LBS | HEART RATE: 72 BPM | HEIGHT: 71 IN

## 2022-08-10 PROCEDURE — 99024 POSTOP FOLLOW-UP VISIT: CPT

## 2022-08-11 NOTE — PHYSICAL EXAM
[de-identified] : soft, non-tender, no rebound/guarding, no masses/hernias, wounds without erythema, drainage, or induration\par 
General

## 2022-08-11 NOTE — ASSESSMENT
[de-identified] : 46yo male with PMHx of HTN, HLD, TATIANNA and obesity s/p laparoscopic sleeve gastrectomy 7/11/2022. \par -tolerating full bariatric diet\par -continue protein intake - 70g per day\par -continue water intake - goal of 60oz per day\par -continue PPI\par -continue MV, calcium and B12\par -medications list reviewed\par -weight progress as above - doing well\par -cleared for full activity in 1 month\par -labs ordered for prior to next visit\par -return to office for 3 month follow up\par -call with concerns

## 2022-10-17 ENCOUNTER — NON-APPOINTMENT (OUTPATIENT)
Age: 47
End: 2022-10-17

## 2022-10-17 LAB
25(OH)D3 SERPL-MCNC: 38 NG/ML
ALBUMIN SERPL ELPH-MCNC: 5.1 G/DL
ALP BLD-CCNC: 54 U/L
ALT SERPL-CCNC: 25 U/L
ANION GAP SERPL CALC-SCNC: 11 MMOL/L
AST SERPL-CCNC: 20 U/L
BASOPHILS # BLD AUTO: 0.03 K/UL
BASOPHILS NFR BLD AUTO: 0.8 %
BILIRUB SERPL-MCNC: 0.5 MG/DL
BUN SERPL-MCNC: 14 MG/DL
CALCIUM SERPL-MCNC: 9.8 MG/DL
CHLORIDE SERPL-SCNC: 105 MMOL/L
CHOLEST SERPL-MCNC: 124 MG/DL
CO2 SERPL-SCNC: 26 MMOL/L
CREAT SERPL-MCNC: 1.1 MG/DL
EGFR: 83 ML/MIN/1.73M2
EOSINOPHIL # BLD AUTO: 0.1 K/UL
EOSINOPHIL NFR BLD AUTO: 2.6 %
FERRITIN SERPL-MCNC: 216 NG/ML
FOLATE RBC-MCNC: 1012 NG/ML
GLUCOSE SERPL-MCNC: 112 MG/DL
HCT VFR BLD CALC: 47.2 %
HCT VFR BLD CALC: 49.9 %
HDLC SERPL-MCNC: 45 MG/DL
HGB BLD-MCNC: 15.8 G/DL
IMM GRANULOCYTES NFR BLD AUTO: 0.3 %
IRON SATN MFR SERPL: 23 %
IRON SERPL-MCNC: 89 UG/DL
LDLC SERPL CALC-MCNC: 56 MG/DL
LYMPHOCYTES # BLD AUTO: 1.27 K/UL
LYMPHOCYTES NFR BLD AUTO: 33.2 %
MAN DIFF?: NORMAL
MCHC RBC-ENTMCNC: 29.4 PG
MCHC RBC-ENTMCNC: 33.5 G/DL
MCV RBC AUTO: 87.7 FL
MONOCYTES # BLD AUTO: 0.36 K/UL
MONOCYTES NFR BLD AUTO: 9.4 %
NEUTROPHILS # BLD AUTO: 2.06 K/UL
NEUTROPHILS NFR BLD AUTO: 53.7 %
NONHDLC SERPL-MCNC: 79 MG/DL
PLATELET # BLD AUTO: 183 K/UL
POTASSIUM SERPL-SCNC: 4.6 MMOL/L
PROT SERPL-MCNC: 7.2 G/DL
RBC # BLD: 5.38 M/UL
RBC # FLD: 13.9 %
SODIUM SERPL-SCNC: 142 MMOL/L
TIBC SERPL-MCNC: 388 UG/DL
TRIGL SERPL-MCNC: 113 MG/DL
UIBC SERPL-MCNC: 299 UG/DL
VIT B1 SERPL-MCNC: 131.7 NMOL/L
VIT B12 SERPL-MCNC: 614 PG/ML
WBC # FLD AUTO: 3.83 K/UL

## 2022-10-17 RX ORDER — BIOTIN 10 MG
TABLET ORAL DAILY
Refills: 0 | Status: ACTIVE | COMMUNITY

## 2022-10-17 RX ORDER — CALCIUM CARB/VITAMIN D3/VIT K1 650MG-12.5
TABLET,CHEWABLE ORAL DAILY
Refills: 0 | Status: ACTIVE | COMMUNITY

## 2022-10-17 RX ORDER — GABAPENTIN 250 MG/5ML
250 SOLUTION ORAL EVERY 8 HOURS
Qty: 15 | Refills: 0 | Status: COMPLETED | COMMUNITY
Start: 2022-06-27 | End: 2022-10-17

## 2022-10-17 RX ORDER — MULTIVITAMIN
TABLET,CHEWABLE ORAL DAILY
Refills: 0 | Status: ACTIVE | COMMUNITY

## 2022-10-17 RX ORDER — ONDANSETRON 4 MG/1
4 TABLET ORAL
Qty: 10 | Refills: 0 | Status: COMPLETED | COMMUNITY
Start: 2022-07-12 | End: 2022-10-17

## 2022-10-18 ENCOUNTER — APPOINTMENT (OUTPATIENT)
Dept: SURGERY | Facility: CLINIC | Age: 47
End: 2022-10-18

## 2022-10-18 VITALS
SYSTOLIC BLOOD PRESSURE: 124 MMHG | WEIGHT: 245 LBS | HEART RATE: 80 BPM | DIASTOLIC BLOOD PRESSURE: 82 MMHG | HEIGHT: 71 IN | OXYGEN SATURATION: 98 % | TEMPERATURE: 96.9 F | BODY MASS INDEX: 34.3 KG/M2

## 2022-10-18 PROCEDURE — 99213 OFFICE O/P EST LOW 20 MIN: CPT

## 2022-10-18 RX ORDER — OMEPRAZOLE 40 MG/1
40 CAPSULE, DELAYED RELEASE ORAL
Qty: 30 | Refills: 2 | Status: COMPLETED | COMMUNITY
Start: 2022-06-27 | End: 2022-10-18

## 2022-10-18 RX ORDER — OMEPRAZOLE 40 MG/1
40 CAPSULE, DELAYED RELEASE ORAL DAILY
Refills: 0 | Status: DISCONTINUED | COMMUNITY
End: 2022-10-18

## 2022-10-18 NOTE — HISTORY OF PRESENT ILLNESS
[de-identified] : Patient had surgery approximately 3 months ago.\par Denies reflux/heartburn/nausea/vomiting. Bowel movements are normal.\par Taking 2 multivitamins with iron, 2 Viactiv chews and 1 vitamin b 12 daily.\par Compliant with CPAP, hypertension and hyperlipidemia improved. recommend following up with PCP to see his his medications could be adjusted.

## 2022-10-18 NOTE — PLAN
[FreeTextEntry1] : Plan: Add weight bearing exercise.\par         Continue with meal planning.\par         RTO in 3 months with blood work.\par         Call with concerns.

## 2022-10-18 NOTE — PHYSICAL EXAM
[Normal] : normoactive bowel sounds, soft and nontender, no hepatosplenomegaly or masses appreciated. Incisions healing appropriately without erythema or drainage [de-identified] : Soft, nondistended

## 2022-10-18 NOTE — ASSESSMENT
[FreeTextEntry1] : DARREN VANCE is a 47 year male seen today for bariatric follow up visit. He is doing well with his weight loss goals.\par Patient is compliant with dietary guidelines.\par Breakfast - mozzarella with tomatoes, eggs with a slice of ham. Lunch - lean meat and fish. Dinner hamburger, fish with green leafy vegetables.\par Fruits apples, berries\par Fluid intake is adequate with water, warm teas and coffee.\par He is walking almost daily and recently joined the gym.  Recommend adding weight bearing exercise.

## 2023-01-17 ENCOUNTER — APPOINTMENT (OUTPATIENT)
Dept: SURGERY | Facility: CLINIC | Age: 48
End: 2023-01-17
Payer: COMMERCIAL

## 2023-01-17 VITALS
SYSTOLIC BLOOD PRESSURE: 122 MMHG | DIASTOLIC BLOOD PRESSURE: 80 MMHG | TEMPERATURE: 97 F | HEART RATE: 98 BPM | BODY MASS INDEX: 34.44 KG/M2 | OXYGEN SATURATION: 99 % | WEIGHT: 246 LBS | HEIGHT: 71 IN

## 2023-01-17 DIAGNOSIS — Z86.39 PERSONAL HISTORY OF OTHER ENDOCRINE, NUTRITIONAL AND METABOLIC DISEASE: ICD-10-CM

## 2023-01-17 DIAGNOSIS — E66.9 OBESITY, UNSPECIFIED: ICD-10-CM

## 2023-01-17 LAB
25(OH)D3 SERPL-MCNC: 31 NG/ML
ALBUMIN SERPL ELPH-MCNC: 5 G/DL
ALP BLD-CCNC: 57 U/L
ALT SERPL-CCNC: 29 U/L
ANION GAP SERPL CALC-SCNC: 12 MMOL/L
AST SERPL-CCNC: 19 U/L
BASOPHILS # BLD AUTO: 0.05 K/UL
BASOPHILS NFR BLD AUTO: 1 %
BILIRUB SERPL-MCNC: 0.5 MG/DL
BUN SERPL-MCNC: 12 MG/DL
CALCIUM SERPL-MCNC: 9.9 MG/DL
CHLORIDE SERPL-SCNC: 107 MMOL/L
CHOLEST SERPL-MCNC: 134 MG/DL
CO2 SERPL-SCNC: 26 MMOL/L
CREAT SERPL-MCNC: 1 MG/DL
EGFR: 93 ML/MIN/1.73M2
EOSINOPHIL # BLD AUTO: 0.14 K/UL
EOSINOPHIL NFR BLD AUTO: 2.9 %
FERRITIN SERPL-MCNC: 155 NG/ML
FOLATE RBC-MCNC: 787 NG/ML
GLUCOSE SERPL-MCNC: 99 MG/DL
HCT VFR BLD CALC: 43.3 %
HCT VFR BLD CALC: 44.6 %
HDLC SERPL-MCNC: 56 MG/DL
HGB BLD-MCNC: 14.9 G/DL
IMM GRANULOCYTES NFR BLD AUTO: 0.2 %
IRON SATN MFR SERPL: 26 %
IRON SERPL-MCNC: 89 UG/DL
LDLC SERPL CALC-MCNC: 55 MG/DL
LYMPHOCYTES # BLD AUTO: 1.56 K/UL
LYMPHOCYTES NFR BLD AUTO: 32.4 %
MAN DIFF?: NORMAL
MCHC RBC-ENTMCNC: 30 PG
MCHC RBC-ENTMCNC: 34.4 G/DL
MCV RBC AUTO: 87.3 FL
MONOCYTES # BLD AUTO: 0.23 K/UL
MONOCYTES NFR BLD AUTO: 4.8 %
NEUTROPHILS # BLD AUTO: 2.82 K/UL
NEUTROPHILS NFR BLD AUTO: 58.7 %
NONHDLC SERPL-MCNC: 78 MG/DL
PLATELET # BLD AUTO: 165 K/UL
POTASSIUM SERPL-SCNC: 4.1 MMOL/L
PROT SERPL-MCNC: 7.2 G/DL
RBC # BLD: 4.96 M/UL
RBC # FLD: 13.9 %
SODIUM SERPL-SCNC: 145 MMOL/L
TIBC SERPL-MCNC: 343 UG/DL
TRIGL SERPL-MCNC: 113 MG/DL
UIBC SERPL-MCNC: 254 UG/DL
VIT B1 SERPL-MCNC: 144.4 NMOL/L
VIT B12 SERPL-MCNC: 564 PG/ML
WBC # FLD AUTO: 4.81 K/UL

## 2023-01-17 PROCEDURE — 99213 OFFICE O/P EST LOW 20 MIN: CPT

## 2023-01-17 RX ORDER — LOSARTAN POTASSIUM 25 MG/1
25 TABLET, FILM COATED ORAL DAILY
Refills: 0 | Status: DISCONTINUED | COMMUNITY
End: 2023-01-17

## 2023-01-17 RX ORDER — ICOSAPENT ETHYL 1000 MG/1
1 CAPSULE ORAL DAILY
Refills: 0 | Status: DISCONTINUED | COMMUNITY
End: 2023-01-17

## 2023-01-17 NOTE — ASSESSMENT
[FreeTextEntry1] : DARREN VANCE is a 47 year male seen today for bariatric follow up visit. \par Patient is compliant with dietary guidelines.\par Breakfast - avocado toast with hard boiled eggs.  Lunch - lean meat and fish with vegetables. Dinner - hamburger, fish with green leafy vegetables. He snacks on Brazil nuts quite often. Recommend that he pay attention to portions and limit himself to 1 ounce.\par Fruits apples, berries\par Fluid intake is adequate with water, warm teas and coffee.\par He is walking almost daily and recently joined the gym. Recommend adding weight bearing exercise. \par \par

## 2023-01-17 NOTE — HISTORY OF PRESENT ILLNESS
[de-identified] : Patient had surgery approximately 6 months ago.\par Denies reflux/heartburn/nausea/vomiting. Bowel movements are normal.\par Taking 2 multivitamins with iron, 2 Viactiv chews and 1 vitamin b 12 daily.\par Compliant with CPAP, hypertension and hyperlipidemia improved. Recommend following up with PCP . \par  \par

## 2023-01-17 NOTE — PLAN
[FreeTextEntry1] : Plan: Follow up with PCP.\par          Add weight bearing exercises.\par          Decrease nuts serving size.\par          RTO in 3 months.\par          Blood work is up to date.\par          Call with concerns.

## 2023-04-24 ENCOUNTER — APPOINTMENT (OUTPATIENT)
Dept: SURGERY | Facility: CLINIC | Age: 48
End: 2023-04-24
Payer: COMMERCIAL

## 2023-04-24 VITALS
TEMPERATURE: 97.1 F | BODY MASS INDEX: 33.88 KG/M2 | SYSTOLIC BLOOD PRESSURE: 114 MMHG | HEIGHT: 71 IN | OXYGEN SATURATION: 98 % | WEIGHT: 242 LBS | DIASTOLIC BLOOD PRESSURE: 80 MMHG | HEART RATE: 84 BPM

## 2023-04-24 DIAGNOSIS — Z87.898 PERSONAL HISTORY OF OTHER SPECIFIED CONDITIONS: ICD-10-CM

## 2023-04-24 DIAGNOSIS — Z01.811 ENCOUNTER FOR PREPROCEDURAL RESPIRATORY EXAMINATION: ICD-10-CM

## 2023-04-24 DIAGNOSIS — Z98.890 PERSONAL HISTORY OF OTHER SPECIFIED CONDITIONS: ICD-10-CM

## 2023-04-24 PROCEDURE — 99213 OFFICE O/P EST LOW 20 MIN: CPT

## 2023-04-24 RX ORDER — FENOFIBRATE 120 MG/1
TABLET ORAL DAILY
Refills: 0 | Status: DISCONTINUED | COMMUNITY
End: 2023-04-24

## 2023-04-24 NOTE — PHYSICAL EXAM
[Normal] : normoactive bowel sounds, soft and nontender, no hepatosplenomegaly or masses appreciated. Incisions healing appropriately without erythema or drainage [de-identified] : Soft, nondistended

## 2023-04-24 NOTE — HISTORY OF PRESENT ILLNESS
[de-identified] : Patient had surgery approximately 9 months ago.\par Denies reflux/heartburn/nausea/vomiting. Bowel movements are normal.\par Taking 2 multivitamins with iron, 2 Viactiv chews and 1 vitamin b 12 daily.\par Compliant with CPAP. He is off antihypertensive medications and hyperlipidemia improved.  \par  \par

## 2023-04-24 NOTE — ASSESSMENT
[FreeTextEntry1] : DARREN VANCE is a 48 year male seen today for bariatric follow up visit. He reports that his portions are a little bigger. \par He reports that he is focusing on healthful choices.\par Breakfast - Greek yogurt with berries and a splash of maple syrup. Lunch - lean meat/fish with vegetables. Dinner - hamburger, fish with green leafy vegetables. He snacks on Brazil nuts, berries and apples. \par Fluid intake is adequate with water, warm teas and coffee.\par He is walking almost daily and I recommended weight bearing/resistant exercises and squats as he is still working from home. \par \par

## 2023-04-24 NOTE — PLAN
[FreeTextEntry1] : Plan: Continue with behavioral changes.\par          RTO in 3 months with blood work.\par          Call with concerns.

## 2023-06-09 ENCOUNTER — NON-APPOINTMENT (OUTPATIENT)
Age: 48
End: 2023-06-09

## 2023-07-06 ENCOUNTER — LABORATORY RESULT (OUTPATIENT)
Age: 48
End: 2023-07-06

## 2023-07-17 ENCOUNTER — APPOINTMENT (OUTPATIENT)
Dept: SURGERY | Facility: CLINIC | Age: 48
End: 2023-07-17
Payer: COMMERCIAL

## 2023-07-17 VITALS
WEIGHT: 241 LBS | HEART RATE: 76 BPM | DIASTOLIC BLOOD PRESSURE: 80 MMHG | SYSTOLIC BLOOD PRESSURE: 114 MMHG | BODY MASS INDEX: 33.74 KG/M2 | HEIGHT: 71 IN | OXYGEN SATURATION: 98 % | TEMPERATURE: 96.9 F

## 2023-07-17 DIAGNOSIS — I10 ESSENTIAL (PRIMARY) HYPERTENSION: ICD-10-CM

## 2023-07-17 DIAGNOSIS — E66.9 OBESITY, UNSPECIFIED: ICD-10-CM

## 2023-07-17 DIAGNOSIS — G47.33 OBSTRUCTIVE SLEEP APNEA (ADULT) (PEDIATRIC): ICD-10-CM

## 2023-07-17 LAB
25(OH)D3 SERPL-MCNC: 26 NG/ML
ALBUMIN SERPL ELPH-MCNC: 4.9 G/DL
ALP BLD-CCNC: 58 U/L
ALT SERPL-CCNC: 29 U/L
ANION GAP SERPL CALC-SCNC: 10 MMOL/L
AST SERPL-CCNC: 17 U/L
BILIRUB SERPL-MCNC: 0.6 MG/DL
BUN SERPL-MCNC: 16 MG/DL
CALCIUM SERPL-MCNC: 9.9 MG/DL
CHLORIDE SERPL-SCNC: 106 MMOL/L
CHOLEST SERPL-MCNC: 130 MG/DL
CO2 SERPL-SCNC: 26 MMOL/L
CREAT SERPL-MCNC: 1 MG/DL
EGFR: 92 ML/MIN/1.73M2
FERRITIN SERPL-MCNC: 124 NG/ML
FOLATE RBC-MCNC: 857 NG/ML
GLUCOSE SERPL-MCNC: 110 MG/DL
HCT VFR BLD CALC: 46.1 %
HDLC SERPL-MCNC: 51 MG/DL
IRON SATN MFR SERPL: 25 %
IRON SERPL-MCNC: 78 UG/DL
LDLC SERPL CALC-MCNC: 61 MG/DL
NONHDLC SERPL-MCNC: 79 MG/DL
POTASSIUM SERPL-SCNC: 4.4 MMOL/L
PROT SERPL-MCNC: 7 G/DL
SODIUM SERPL-SCNC: 142 MMOL/L
TIBC SERPL-MCNC: 316 UG/DL
TRIGL SERPL-MCNC: 88 MG/DL
UIBC SERPL-MCNC: 238 UG/DL
VIT B1 SERPL-MCNC: 129.4 NMOL/L
VIT B12 SERPL-MCNC: 315 PG/ML

## 2023-07-17 PROCEDURE — 99213 OFFICE O/P EST LOW 20 MIN: CPT

## 2023-07-17 NOTE — HISTORY OF PRESENT ILLNESS
[de-identified] : Patient had surgery approximately 1 year ago. [place] in [place] next few [place].\par Denies reflux/heartburn/nausea/vomiting. Bowel movements are normal.\par Taking 2 multivitamins with iron, 2 Viactiv chews and 1 vitamin b 12 daily. Reinforced daily compliance with vitamins and minerals as his vitamin b 12 was slightly below normal\par Compliant with CPAP. He is off antihypertensive medications and hyperlipidemia improved. \par  \par

## 2023-07-17 NOTE — PLAN
[FreeTextEntry1] : Plan: Continue with behavioral changes.\par          Call with concerns and questions.

## 2023-07-17 NOTE — ASSESSMENT
[FreeTextEntry1] : DARREN VANCE is a 48 year male seen today for bariatric follow up visit. He feels great. \par He reports that he is focusing on healthful choices.\par He eats 3 small meals daily that includes Greek yogurt with berries and a splash of maple syrup, lean meat/fish with vegetables hamburger, fish with green leafy vegetables. He snacks on Brazil nuts, berries and apples. \par Fluid intake is adequate with water, warm teas and coffee.\par He is walking almost daily and he added weight bearing/resistant exercises.

## 2023-08-01 ENCOUNTER — APPOINTMENT (OUTPATIENT)
Dept: UROLOGY | Facility: CLINIC | Age: 48
End: 2023-08-01
Payer: COMMERCIAL

## 2023-08-01 ENCOUNTER — APPOINTMENT (OUTPATIENT)
Dept: UROLOGY | Facility: CLINIC | Age: 48
End: 2023-08-01

## 2023-08-01 VITALS
HEIGHT: 71 IN | TEMPERATURE: 98.2 F | BODY MASS INDEX: 33.46 KG/M2 | RESPIRATION RATE: 16 BRPM | OXYGEN SATURATION: 99 % | HEART RATE: 73 BPM | WEIGHT: 239 LBS | SYSTOLIC BLOOD PRESSURE: 125 MMHG | DIASTOLIC BLOOD PRESSURE: 75 MMHG

## 2023-08-01 PROCEDURE — 99213 OFFICE O/P EST LOW 20 MIN: CPT

## 2023-08-01 NOTE — HISTORY OF PRESENT ILLNESS
[FreeTextEntry1] : 48-year-old male with history of BPH and weak urinary flow.  Nocturia 0-1 times a night.  This is stable and not bothersome to patient.  PSA last year was 1.8 ng/mL.  He has family history of prostate cancer, uncle.  No recent PSA to review.

## 2023-08-01 NOTE — ASSESSMENT
[FreeTextEntry1] : Patient not bothered by lower urinary tract symptoms.  Does not want treatment.  PSA ordered.  Patient will get done as soon as possible and call for results.  Provided PSA is stable.  Follow-up 1 year with repeat PSA.

## 2024-08-06 ENCOUNTER — APPOINTMENT (OUTPATIENT)
Dept: UROLOGY | Facility: CLINIC | Age: 49
End: 2024-08-06